# Patient Record
Sex: MALE | Race: WHITE | HISPANIC OR LATINO | Employment: FULL TIME | URBAN - METROPOLITAN AREA
[De-identification: names, ages, dates, MRNs, and addresses within clinical notes are randomized per-mention and may not be internally consistent; named-entity substitution may affect disease eponyms.]

---

## 2017-02-03 ENCOUNTER — OFFICE VISIT (OUTPATIENT)
Dept: URGENT CARE | Facility: CLINIC | Age: 32
End: 2017-02-03
Payer: OTHER GOVERNMENT

## 2017-02-03 VITALS
HEART RATE: 76 BPM | OXYGEN SATURATION: 95 % | TEMPERATURE: 97.4 F | RESPIRATION RATE: 16 BRPM | DIASTOLIC BLOOD PRESSURE: 80 MMHG | SYSTOLIC BLOOD PRESSURE: 124 MMHG

## 2017-02-03 DIAGNOSIS — R05.9 COUGH: ICD-10-CM

## 2017-02-03 DIAGNOSIS — R11.2 NON-INTRACTABLE VOMITING WITH NAUSEA, UNSPECIFIED VOMITING TYPE: ICD-10-CM

## 2017-02-03 DIAGNOSIS — H61.21 IMPACTED CERUMEN OF RIGHT EAR: ICD-10-CM

## 2017-02-03 DIAGNOSIS — J11.1 INFLUENZA-LIKE ILLNESS: Primary | ICD-10-CM

## 2017-02-03 LAB
FLUAV+FLUBV AG SPEC QL IA: NEGATIVE
INT CON NEG: NEGATIVE
INT CON POS: POSITIVE

## 2017-02-03 PROCEDURE — 99204 OFFICE O/P NEW MOD 45 MIN: CPT | Mod: 25 | Performed by: PHYSICIAN ASSISTANT

## 2017-02-03 PROCEDURE — 69210 REMOVE IMPACTED EAR WAX UNI: CPT | Performed by: PHYSICIAN ASSISTANT

## 2017-02-03 PROCEDURE — 87804 INFLUENZA ASSAY W/OPTIC: CPT | Performed by: PHYSICIAN ASSISTANT

## 2017-02-03 RX ORDER — ONDANSETRON 4 MG/1
4 TABLET, ORALLY DISINTEGRATING ORAL ONCE
Status: COMPLETED | OUTPATIENT
Start: 2017-02-03 | End: 2017-02-03

## 2017-02-03 RX ORDER — ONDANSETRON 4 MG/1
4 TABLET, ORALLY DISINTEGRATING ORAL EVERY 4 HOURS PRN
Qty: 20 TAB | Refills: 0 | Status: SHIPPED | OUTPATIENT
Start: 2017-02-03 | End: 2018-02-26

## 2017-02-03 RX ORDER — OSELTAMIVIR PHOSPHATE 75 MG/1
75 CAPSULE ORAL 2 TIMES DAILY
Qty: 10 CAP | Refills: 0 | Status: SHIPPED | OUTPATIENT
Start: 2017-02-03 | End: 2018-02-26

## 2017-02-03 RX ADMIN — ONDANSETRON 4 MG: 4 TABLET, ORALLY DISINTEGRATING ORAL at 10:16

## 2017-02-03 ASSESSMENT — ENCOUNTER SYMPTOMS
COUGH: 1
RHINORRHEA: 1
SORE THROAT: 0
SINUS PAIN: 1
NAUSEA: 1
HEADACHES: 1
MYALGIAS: 1
CHILLS: 1
VOMITING: 1

## 2017-02-03 NOTE — PATIENT INSTRUCTIONS
"Influenza, Adult  Influenza (\"the flu\") is a viral infection of the respiratory tract. It occurs more often in winter months because people spend more time in close contact with one another. Influenza can make you feel very sick. Influenza easily spreads from person to person (contagious).  CAUSES   Influenza is caused by a virus that infects the respiratory tract. You can catch the virus by breathing in droplets from an infected person's cough or sneeze. You can also catch the virus by touching something that was recently contaminated with the virus and then touching your mouth, nose, or eyes.  RISKS AND COMPLICATIONS  You may be at risk for a more severe case of influenza if you smoke cigarettes, have diabetes, have chronic heart disease (such as heart failure) or lung disease (such as asthma), or if you have a weakened immune system. Elderly people and pregnant women are also at risk for more serious infections. The most common problem of influenza is a lung infection (pneumonia). Sometimes, this problem can require emergency medical care and may be life threatening.  SIGNS AND SYMPTOMS   Symptoms typically last 4 to 10 days and may include:  · Fever.  · Chills.  · Headache, body aches, and muscle aches.  · Sore throat.  · Chest discomfort and cough.  · Poor appetite.  · Weakness or feeling tired.  · Dizziness.  · Nausea or vomiting.  DIAGNOSIS   Diagnosis of influenza is often made based on your history and a physical exam. A nose or throat swab test can be done to confirm the diagnosis.  TREATMENT   In mild cases, influenza goes away on its own. Treatment is directed at relieving symptoms. For more severe cases, your health care provider may prescribe antiviral medicines to shorten the sickness. Antibiotic medicines are not effective because the infection is caused by a virus, not by bacteria.  HOME CARE INSTRUCTIONS  · Take medicines only as directed by your health care provider.  · Use a cool mist humidifier " to make breathing easier.  · Get plenty of rest until your temperature returns to normal. This usually takes 3 to 4 days.  · Drink enough fluid to keep your urine clear or pale yellow.  · Cover your mouth and nose when coughing or sneezing, and wash your hands well to prevent the virus from spreading.  · Stay home from work or school until the fever is gone for at least 1 full day.  PREVENTION   An annual influenza vaccination (flu shot) is the best way to avoid getting influenza. An annual flu shot is now routinely recommended for all adults in the [place]  SEEK MEDICAL CARE IF:  · You experience chest pain, your cough worsens, or you produce more mucus.  · You have nausea, vomiting, or diarrhea.  · Your fever returns or gets worse.  SEEK IMMEDIATE MEDICAL CARE IF:  · You have trouble breathing, you become short of breath, or your skin or nails become bluish.  · You have severe pain or stiffness in the neck.  · You develop a sudden headache, or pain in the face or ear.  · You have nausea or vomiting that you cannot control.  MAKE SURE YOU:   · Understand these instructions.  · Will watch your condition.  · Will get help right away if you are not doing well or get worse.     This information is not intended to replace advice given to you by your health care provider. Make sure you discuss any questions you have with your health care provider.     Document Released: 12/15/2001 Document Revised: 01/08/2016 Document Reviewed: 03/18/2013  [employer] Interactive Patient Education ©2016 [employer]

## 2017-02-03 NOTE — MR AVS SNAPSHOT
Roshan Bailey   2/3/2017 9:15 AM   Office Visit   MRN: [id number]    [facility]  [facility]   Dept Phone:  [phone]    Description:  Male : 1985   Provider:  Rosemarie Howard PA-C           Reason for Visit     Emesis ear ringing, congestion, SOB, headache X yesterday       Allergies as of 2/3/2017     No Known Allergies      You were diagnosed with     Influenza-like illness   [937580]  -  Primary     Cough   [786.2.ICD-9-CM]       Impacted cerumen of right ear   [931791]       Non-intractable vomiting with nausea, unspecified vomiting type   [1604399]         Vital Signs     Blood Pressure Pulse Temperature Respirations Oxygen Saturation       124/80 mmHg 76 36.3 °C (97.4 °F) 16 95%       Basic Information     Date Of Birth Sex Race Ethnicity Preferred Language    1985 Male White Non- English      Health Maintenance     Patient has no pending health maintenance at this time      Results     POCT Influenza A/B      Component    Rapid Influenza A-B    Negative    Internal Control Positive    Positive    Internal Control Negative    Negative                        Current Immunizations     No immunizations on file.      Below and/or attached are the medications your provider expects you to take. Review all of your home medications and newly ordered medications with your provider and/or pharmacist. Follow medication instructions as directed by your provider and/or pharmacist. Please keep your medication list with you and share with your provider. Update the information when medications are discontinued, doses are changed, or new medications (including over-the-counter products) are added; and carry medication information at all times in the event of emergency situations     Allergies:  No Known Allergies          Medications  Valid as of: 2017 - 10:37 AM    Generic Name Brand Name Tablet Size Instructions for use    Ondansetron (TABLET DISPERSIBLE) ZOFRAN ODT 4  "MG Take 1 Tab by mouth every four hours as needed for Nausea/Vomiting.        Oseltamivir Phosphate (Cap) TAMIFLU 75 MG Take 1 Cap by mouth 2 times a day.        .                 Medicines prescribed today were sent to:     [facility] [place] - [address]    [address] [place] [address]    Phone: [phone] Fax: [phone]    Open 24 Hours?: No      Medication refill instructions:       If your prescription bottle indicates you have medication refills left, it is not necessary to call your provider’s office. Please contact your pharmacy and they will refill your medication.    If your prescription bottle indicates you do not have any refills left, you may request refills at any time through one of the following ways: The online [email or web address] system (except Urgent Care), by calling your provider’s office, or by asking your pharmacy to contact your provider’s office with a refill request. Medication refills are processed only during regular business hours and may not be available until the next business day. Your provider may request additional information or to have a follow-up visit with you prior to refilling your medication.   *Please Note: Medication refills are assigned a new Rx number when refilled electronically. Your pharmacy may indicate that no refills were authorized even though a new prescription for the same medication is available at the pharmacy. Please request the medicine by name with the pharmacy before contacting your provider for a refill.        Instructions    Influenza, Adult  Influenza (\"the flu\") is a viral infection of the respiratory tract. It occurs more often in winter months because people spend more time in close contact with one another. Influenza can make you feel very sick. Influenza easily spreads from person to person (contagious).  CAUSES   Influenza is caused by a virus that infects the respiratory tract. You can catch the virus by breathing in droplets from an infected " person's cough or sneeze. You can also catch the virus by touching something that was recently contaminated with the virus and then touching your mouth, nose, or eyes.  RISKS AND COMPLICATIONS  You may be at risk for a more severe case of influenza if you smoke cigarettes, have diabetes, have chronic heart disease (such as heart failure) or lung disease (such as asthma), or if you have a weakened immune system. Elderly people and pregnant women are also at risk for more serious infections. The most common problem of influenza is a lung infection (pneumonia). Sometimes, this problem can require emergency medical care and may be life threatening.  SIGNS AND SYMPTOMS   Symptoms typically last 4 to 10 days and may include:  · Fever.  · Chills.  · Headache, body aches, and muscle aches.  · Sore throat.  · Chest discomfort and cough.  · Poor appetite.  · Weakness or feeling tired.  · Dizziness.  · Nausea or vomiting.  DIAGNOSIS   Diagnosis of influenza is often made based on your history and a physical exam. A nose or throat swab test can be done to confirm the diagnosis.  TREATMENT   In mild cases, influenza goes away on its own. Treatment is directed at relieving symptoms. For more severe cases, your health care provider may prescribe antiviral medicines to shorten the sickness. Antibiotic medicines are not effective because the infection is caused by a virus, not by bacteria.  HOME CARE INSTRUCTIONS  · Take medicines only as directed by your health care provider.  · Use a cool mist humidifier to make breathing easier.  · Get plenty of rest until your temperature returns to normal. This usually takes 3 to 4 days.  · Drink enough fluid to keep your urine clear or pale yellow.  · Cover your mouth and nose when coughing or sneezing, and wash your hands well to prevent the virus from spreading.  · Stay home from work or school until the fever is gone for at least 1 full day.  PREVENTION   An annual influenza vaccination  (flu shot) is the best way to avoid getting influenza. An annual flu shot is now routinely recommended for all adults in the [place]  SEEK MEDICAL CARE IF:  · You experience chest pain, your cough worsens, or you produce more mucus.  · You have nausea, vomiting, or diarrhea.  · Your fever returns or gets worse.  SEEK IMMEDIATE MEDICAL CARE IF:  · You have trouble breathing, you become short of breath, or your skin or nails become bluish.  · You have severe pain or stiffness in the neck.  · You develop a sudden headache, or pain in the face or ear.  · You have nausea or vomiting that you cannot control.  MAKE SURE YOU:   · Understand these instructions.  · Will watch your condition.  · Will get help right away if you are not doing well or get worse.     This information is not intended to replace advice given to you by your health care provider. Make sure you discuss any questions you have with your health care provider.     Document Released: 12/15/2001 Document Revised: 01/08/2016 Document Reviewed: 03/18/2013  [employer] Interactive Patient Education ©2016 [employer].            Estrogen Gene Test Access Code: [email or web address]  Expires: 3/5/2017 10:37 AM    Your email address is not on file at [facility].  Email Addresses are required for you to sign up for Estrogen Gene Test, please contact [phone] to verify your personal information and to provide your email address prior to attempting to register for Estrogen Gene Test[place] [address]    Estrogen Gene Test  A secure, online tool to manage your health information     [facility] Estrogen Gene Test® is a secure, online tool that connects you to your personalized health information from the privacy of your home -- day or night - making it very easy for you to manage your healthcare. Once the activation process is completed, you can even access your medical information using the Estrogen Gene Test gideon, which is available for free in the Apple Gideon store or Google Play store.      To learn more about [employer], visit [email or web address]    There are two levels of access available (as shown below):   My Chart Features  [facility]  Specialists [facility]  Urgent  Care Non-[facility] Doctor   Email your healthcare team securely and privately 24/7 X X X    Manage appointments: schedule your next appointment; view details of past/upcoming appointments X      Request prescription refills. X      View recent personal medical records, including lab and immunizations X X X X   View health record, including health history, allergies, medications X X X X   Read reports about your outpatient visits, procedures, consult and ER notes X X X X   See your discharge summary, which is a recap of your hospital and/or ER visit that includes your diagnosis, lab results, and care plan X X  X     How to register for [employer]:  Once your e-mail address has been verified, follow the following steps to sign up for [employer].     1. Go to  [email or web address]  2. Click on the Sign Up Now box, which takes you to the New Member Sign Up page. You will need to provide the following information:  a. Enter your [employer] Access Code exactly as it appears at the top of this page. (You will not need to use this code after you’ve completed the sign-up process. If you do not sign up before the expiration date, you must request a new code.)   b. Enter your date of birth.   c. Enter your home email address.   d. Click Submit, and follow the next screen’s instructions.  3. Create a Inkshares ID. This will be your [facility] login ID and cannot be changed, so think of one that is secure and easy to remember.  4. Create a [employer] password. You can change your password at any time.  5. Enter your Password Reset Question and Answer. This can be used at a later time if you forget your password.   6. Enter your e-mail address. This allows you to receive e-mail notifications when new information is available  in [facility].  7. Click Sign Up. You can now view your health information.    For assistance activating your [employer] account, call [phone]

## 2017-02-03 NOTE — PROGRESS NOTES
Subjective:      Roshan Bailey is a 31 y.o. male who presents with Emesis    Pt PMH, SocHx, SurgHx, FamHx, Drug allergies and medications reviewed with pt/EPIC.      Family history reviewed, it is not pertinent to this complaint.         HPI Comments: Patient presents with:  Emesis: ear ringing, congestion, SOB, headache X yesterday         URI   The current episode started yesterday. The problem has been gradually worsening. The maximum temperature recorded prior to his arrival was 100.4 - 100.9 F. The fever has been present for less than 1 day. Associated symptoms include coughing, headaches, nausea, a plugged ear sensation (right), rhinorrhea, sinus pain, sneezing and vomiting. Pertinent negatives include no ear pain or sore throat. He has tried nothing for the symptoms. The treatment provided no relief.     Review of Systems   Constitutional: Positive for chills.   HENT: Positive for rhinorrhea and sneezing. Negative for ear pain and sore throat.    Respiratory: Positive for cough.    Gastrointestinal: Positive for nausea and vomiting.   Musculoskeletal: Positive for myalgias.   Neurological: Positive for headaches.   All other systems reviewed and are negative.       Objective:     /80 mmHg  Pulse 76  Temp(Src) 36.3 °C (97.4 °F)  Resp 16  SpO2 95%     Physical Exam   Constitutional: He is oriented to person, place, and time. He appears well-developed and well-nourished. He appears ill. No distress.   HENT:   Head: Normocephalic.   Left Ear: Tympanic membrane normal.   Nose: Mucosal edema and rhinorrhea present.   Mouth/Throat: Uvula is midline and mucous membranes are normal. Posterior oropharyngeal erythema present.   Right canal occluded by cerumen, TM not visible will re-evaluate after ear lavage.    Eyes: Conjunctivae, EOM and lids are normal. Pupils are equal, round, and reactive to light.   Neck: Trachea normal and normal range of motion. Neck supple. No JVD present.   Cardiovascular:  Regular rhythm and normal heart sounds.  Tachycardia present.    Pulmonary/Chest: Effort normal. He has rhonchi. He has no rales.   Abdominal: Soft.   Musculoskeletal: Normal range of motion.   Lymphadenopathy:     He has no cervical adenopathy.   Neurological: He is alert and oriented to person, place, and time.   Skin: Skin is warm and dry.   Face is flushed     Psychiatric: He has a normal mood and affect.   Vitals reviewed.       Procedure: Cerumen Removal  Risks and benefits of procedure discussed  Cerumen removed with curette and lavage after softening agent instilled  Patient tolerated well  Post procedure exam with clear canal and normal TM       Assessment/Plan:     1. Influenza-like illness  POCT Influenza A/B    oseltamivir (TAMIFLU) 75 MG Cap    ondansetron (ZOFRAN ODT) 4 MG TABLET DISPERSIBLE    ondansetron (ZOFRAN ODT) dispertab 4 mg   2. Cough  POCT Influenza A/B    oseltamivir (TAMIFLU) 75 MG Cap    ondansetron (ZOFRAN ODT) 4 MG TABLET DISPERSIBLE    ondansetron (ZOFRAN ODT) dispertab 4 mg   3. Impacted cerumen of right ear  POCT Influenza A/B    oseltamivir (TAMIFLU) 75 MG Cap    ondansetron (ZOFRAN ODT) 4 MG TABLET DISPERSIBLE    ondansetron (ZOFRAN ODT) dispertab 4 mg   4. Non-intractable vomiting with nausea, unspecified vomiting type       Discussed that I felt this was viral in nature. Did not see any evidence of a bacterial process. Discussed natural progression and sx care.    PT should follow up with PCP in 1-2 days for re-evaluation if symptoms have not improved.  Discussed red flags and reasons to return to UC or ED.  Pt and/or family verbalized understanding of diagnosis and follow up instructions and was given informational handout on diagnosis.  PT discharged.

## 2017-02-03 NOTE — Clinical Note
February 3, 2017         Patient: Roshan Bailey   YOB: 1985   Date of Visit: 2/3/2017           To Whom it May Concern:    Roshan Bailey was seen in my clinic on 2/3/2017. He may return to work on 02/07/2017..    If you have any questions or concerns, please don't hesitate to call.        Sincerely,           Rosemaire Howard PA-C  Electronically Signed

## 2017-06-23 ENCOUNTER — HOSPITAL ENCOUNTER (OUTPATIENT)
Dept: RADIOLOGY | Facility: MEDICAL CENTER | Age: 32
End: 2017-06-23
Attending: PHYSICIAN ASSISTANT
Payer: OTHER GOVERNMENT

## 2017-06-23 ENCOUNTER — OFFICE VISIT (OUTPATIENT)
Dept: URGENT CARE | Facility: PHYSICIAN GROUP | Age: 32
End: 2017-06-23
Payer: OTHER GOVERNMENT

## 2017-06-23 VITALS
HEART RATE: 64 BPM | TEMPERATURE: 98.1 F | HEIGHT: 71 IN | OXYGEN SATURATION: 97 % | BODY MASS INDEX: 28 KG/M2 | WEIGHT: 200 LBS | SYSTOLIC BLOOD PRESSURE: 128 MMHG | DIASTOLIC BLOOD PRESSURE: 92 MMHG

## 2017-06-23 DIAGNOSIS — G89.29 CHRONIC PAIN OF LEFT KNEE: ICD-10-CM

## 2017-06-23 DIAGNOSIS — M25.562 CHRONIC PAIN OF LEFT KNEE: ICD-10-CM

## 2017-06-23 PROCEDURE — 99214 OFFICE O/P EST MOD 30 MIN: CPT | Performed by: PHYSICIAN ASSISTANT

## 2017-06-23 PROCEDURE — 73564 X-RAY EXAM KNEE 4 OR MORE: CPT | Mod: LT

## 2017-06-23 ASSESSMENT — ENCOUNTER SYMPTOMS
COUGH: 0
PALPITATIONS: 0
FOCAL WEAKNESS: 0
TINGLING: 0
FEVER: 0
SHORTNESS OF BREATH: 0
CHILLS: 0
LOSS OF CONSCIOUSNESS: 0

## 2017-06-23 NOTE — Clinical Note
June 23, 2017         Patient: Roshan Bailey   YOB: 1985   Date of Visit: 6/23/2017           To Whom it May Concern:    Roshan Bailey was seen in my clinic on 6/23/2017. 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    If you have any questions or concerns, please don't hesitate to call.        Sincerely,           Ra eLnnon PA-C  Electronically Signed

## 2017-06-23 NOTE — PATIENT INSTRUCTIONS
RICE for Routine Care of Injuries  The routine care of many injuries includes rest, ice, compression, and elevation (RICE). The RICE strategy is often recommended for injuries to soft tissues, such as a muscle strain, ligament injuries, bruises, and overuse injuries. It can also be used for some bony injuries. Using the RICE strategy can help to relieve pain, lessen swelling, and enable your body to heal.  Rest  Rest is required to allow your body to heal. This usually involves reducing your normal activities and avoiding use of the injured part of your body. Generally, you can return to your normal activities when you are comfortable and have been given permission by your health care provider.  Ice  Icing your injury helps to keep the swelling down, and it lessens pain. Do not apply ice directly to your skin.  · Put ice in a plastic bag.  · Place a towel between your skin and the bag.  · Leave the ice on for 20 minutes, 2-3 times a day.  Do this for as long as you are directed by your health care provider.  Compression  Compression means putting pressure on the injured area. Compression helps to keep swelling down, gives support, and helps with discomfort. Compression may be done with an elastic bandage. If an elastic bandage has been applied, follow these general tips:  · Remove and reapply the bandage every 3-4 hours or as directed by your health care provider.  · Make sure the bandage is not wrapped too tightly, because this can cut off circulation. If part of your body beyond the bandage becomes blue, numb, cold, swollen, or more painful, your bandage is most likely too tight. If this occurs, remove your bandage and reapply it more loosely.  · See your health care provider if the bandage seems to be making your problems worse rather than better.  Elevation  Elevation means keeping the injured area raised. This helps to lessen swelling and decrease pain. If possible, your injured area should be elevated at or  above the level of your heart or the center of your chest.  WHEN SHOULD I SEEK MEDICAL CARE?  You should seek medical care if:  · Your pain and swelling continue.  · Your symptoms are getting worse rather than improving.  These symptoms may indicate that further evaluation or further X-rays are needed. Sometimes, X-rays may not show a small broken bone (fracture) until a number of days later. Make a follow-up appointment with your health care provider.  WHEN SHOULD I SEEK IMMEDIATE MEDICAL CARE?  You should seek immediate medical care if:  · You have sudden severe pain at or below the area of your injury.  · You have redness or increased swelling around your injury.  · You have tingling or numbness at or below the area of your injury that does not improve after you remove the elastic bandage.     This information is not intended to replace advice given to you by your health care provider. Make sure you discuss any questions you have with your health care provider.     Document Released: 04/01/2002 Document Revised: 12/23/2014 Document Reviewed: 11/25/2015  [employer] Interactive Patient Education ©2016 [employer].

## 2017-06-23 NOTE — PROGRESS NOTES
"Subjective:      Roshan Bailey is a 31 y.o. male who presents with Knee Pain            Knee Pain  This is a new problem. The current episode started more than 1 month ago. The problem occurs constantly. The problem has been unchanged. Pertinent negatives include no chest pain, chills, coughing, fever or rash. Associated symptoms comments: Stiffness and pain.. The symptoms are aggravated by walking (sitting). He has tried NSAIDs and ice for the symptoms. The treatment provided no relief.       Review of Systems   Constitutional: Negative for fever and chills.   Respiratory: Negative for cough and shortness of breath.    Cardiovascular: Negative for chest pain and palpitations.   Musculoskeletal: Positive for joint pain.        Left knee pain   Skin: Negative for rash.   Neurological: Negative for tingling, focal weakness and loss of consciousness.     All other systems reviewed and are negative.  PMH:  has no past medical history on file.  MEDS:   Current outpatient prescriptions:   •  oseltamivir (TAMIFLU) 75 MG Cap, Take 1 Cap by mouth 2 times a day., Disp: 10 Cap, Rfl: 0  •  ondansetron (ZOFRAN ODT) 4 MG TABLET DISPERSIBLE, Take 1 Tab by mouth every four hours as needed for Nausea/Vomiting., Disp: 20 Tab, Rfl: 0  ALLERGIES: No Known Allergies  SURGHX: History reviewed. No pertinent past surgical history.  SOCHX:    FH: Family history was reviewed, no pertinent findings to report  Medications, Allergies, and current problem list reviewed today in Epic       Objective:     /92 mmHg  Pulse 64  Temp(Src) 36.7 °C (98.1 °F)  Ht 1.803 m (5' 10.98\")  Wt 90.719 kg (200 lb)  BMI 27.91 kg/m2  SpO2 97%     Physical Exam   Constitutional: He is oriented to person, place, and time. He appears well-developed and well-nourished.   Cardiovascular: Normal rate, regular rhythm, normal heart sounds, intact distal pulses and normal pulses.    Pulmonary/Chest: Effort normal and breath sounds normal. "   Musculoskeletal: Normal range of motion. He exhibits tenderness. He exhibits no edema or deformity.   Pain to palpation of left knee.  Pain with squatting.  NEG anterior/post drawer.  NEG varus/valgus.   Neurological: He is alert and oriented to person, place, and time. He has normal reflexes. He displays normal reflexes. He exhibits normal muscle tone. Coordination normal.   Skin: Skin is warm and dry.   Psychiatric: He has a normal mood and affect. His behavior is normal. Judgment and thought content normal.   Vitals reviewed.         6/23/2017 4:07 PM    HISTORY/REASON FOR EXAM:  Chronic left knee pain with no history of injury      TECHNIQUE/EXAM DESCRIPTION AND NUMBER OF VIEWS:  4 views of the LEFT knee.    COMPARISON: None    FINDINGS:  Bone density is normal.  There is no evidence of fracture or dislocation.  There is no evidence of arthropathy.  There is no joint effusion.         Impression        No evidence of fracture or dislocation.          Assessment/Plan:   Patient is a 31-year-old male who presents with pain of the left knee for over a month. No known injury has occurred. Patient works in the [employer] and is active daily running. He reports pain at rest as well. He denies numbness or tingling of the limb. Vital signs normal. Patient has good range of motion of the knee. Anterior/posterior drawer negative. There is valgus are negative. There is pain when he squats. X-rays are negative. This most likely is due to an overuse injury. We will trial pain management of Voltaren gel and referred to sports medicine.    1. Chronic pain of left knee  DX-KNEE COMPLETE 4+ LEFT    REFERRAL TO SPORTS MEDICINE    Diclofenac Sodium 1 % Gel     Differential diagnosis, natural history, supportive care, and indications for immediate follow-up discussed at length.   Follow-up with primary care provider within 4-5 days, emergency room precautions discussed.  Patient and/or family appears understanding of  information.

## 2017-06-23 NOTE — MR AVS SNAPSHOT
"        Roshan Bailey   2017 3:30 PM   Office Visit   MRN: [id number]    Department:  [facility]   Dept Phone:  [phone]    Description:  Male : 1985   Provider:  Ra Lennon PA-C           Reason for Visit     Knee Pain left knee pain x1 month, no known injury      Allergies as of 2017     No Known Allergies      You were diagnosed with     Chronic pain of left knee   [062122]         Vital Signs     Blood Pressure Pulse Temperature Height Weight Body Mass Index    128/92 mmHg 64 36.7 °C (98.1 °F) 1.803 m (5' 10.98\") 90.719 kg (200 lb) 27.91 kg/m2    Oxygen Saturation                   97%           Basic Information     Date Of Birth Sex Race Ethnicity Preferred Language    1985 Male White Non- English      Health Maintenance        Date Due Completion Dates    IMM DTaP/Tdap/Td Vaccine (1 - Tdap) 2006 ---            Current Immunizations     No immunizations on file.      Below and/or attached are the medications your provider expects you to take. Review all of your home medications and newly ordered medications with your provider and/or pharmacist. Follow medication instructions as directed by your provider and/or pharmacist. Please keep your medication list with you and share with your provider. Update the information when medications are discontinued, doses are changed, or new medications (including over-the-counter products) are added; and carry medication information at all times in the event of emergency situations     Allergies:  No Known Allergies          Medications  Valid as of: 2017 -  5:18 PM    Generic Name Brand Name Tablet Size Instructions for use    Diclofenac Sodium (Gel) Diclofenac Sodium 1 % Apply 2-4 grams to affected area 4 times daily as needed for pain.        Ondansetron (TABLET DISPERSIBLE) ZOFRAN ODT 4 MG Take 1 Tab by mouth every four hours as needed for Nausea/Vomiting.        Oseltamivir Phosphate (Cap) TAMIFLU 75 " MG Take 1 Cap by mouth 2 times a day.        .                 Medicines prescribed today were sent to:     [id number] - [place], [place] - [address]    [address] [place] [address]    Phone: [phone] Fax: [phone]    Open 24 Hours?: No      Medication refill instructions:       If your prescription bottle indicates you have medication refills left, it is not necessary to call your provider’s office. Please contact your pharmacy and they will refill your medication.    If your prescription bottle indicates you do not have any refills left, you may request refills at any time through one of the following ways: The online [facility] system (except Urgent Care), by calling your provider’s office, or by asking your pharmacy to contact your provider’s office with a refill request. Medication refills are processed only during regular business hours and may not be available until the next business day. Your provider may request additional information or to have a follow-up visit with you prior to refilling your medication.   *Please Note: Medication refills are assigned a new Rx number when refilled electronically. Your pharmacy may indicate that no refills were authorized even though a new prescription for the same medication is available at the pharmacy. Please request the medicine by name with the pharmacy before contacting your provider for a refill.        Your To Do List     Future Labs/Procedures Complete By Expires    DX-KNEE COMPLETE 4+ LEFT  As directed 6/23/2018      Referral     A referral request has been sent to our patient care coordination department. Please allow 3-5 business days for us to process this request and contact you either by phone or mail. If you do not hear from us by the 5th business day, please call us at [phone].        Instructions    RICE for Routine Care of Injuries  The routine care of many injuries includes rest, ice, compression, and elevation  (RICE). The RICE strategy is often recommended for injuries to soft tissues, such as a muscle strain, ligament injuries, bruises, and overuse injuries. It can also be used for some bony injuries. Using the RICE strategy can help to relieve pain, lessen swelling, and enable your body to heal.  Rest  Rest is required to allow your body to heal. This usually involves reducing your normal activities and avoiding use of the injured part of your body. Generally, you can return to your normal activities when you are comfortable and have been given permission by your health care provider.  Ice  Icing your injury helps to keep the swelling down, and it lessens pain. Do not apply ice directly to your skin.  · Put ice in a plastic bag.  · Place a towel between your skin and the bag.  · Leave the ice on for 20 minutes, 2-3 times a day.  Do this for as long as you are directed by your health care provider.  Compression  Compression means putting pressure on the injured area. Compression helps to keep swelling down, gives support, and helps with discomfort. Compression may be done with an elastic bandage. If an elastic bandage has been applied, follow these general tips:  · Remove and reapply the bandage every 3-4 hours or as directed by your health care provider.  · Make sure the bandage is not wrapped too tightly, because this can cut off circulation. If part of your body beyond the bandage becomes blue, numb, cold, swollen, or more painful, your bandage is most likely too tight. If this occurs, remove your bandage and reapply it more loosely.  · See your health care provider if the bandage seems to be making your problems worse rather than better.  Elevation  Elevation means keeping the injured area raised. This helps to lessen swelling and decrease pain. If possible, your injured area should be elevated at or above the level of your heart or the center of your chest.  WHEN SHOULD I SEEK MEDICAL CARE?  You should seek medical  care if:  · Your pain and swelling continue.  · Your symptoms are getting worse rather than improving.  These symptoms may indicate that further evaluation or further X-rays are needed. Sometimes, X-rays may not show a small broken bone (fracture) until a number of days later. Make a follow-up appointment with your health care provider.  WHEN SHOULD I SEEK IMMEDIATE MEDICAL CARE?  You should seek immediate medical care if:  · You have sudden severe pain at or below the area of your injury.  · You have redness or increased swelling around your injury.  · You have tingling or numbness at or below the area of your injury that does not improve after you remove the elastic bandage.     This information is not intended to replace advice given to you by your health care provider. Make sure you discuss any questions you have with your health care provider.     Document Released: 04/01/2002 Document Revised: 12/23/2014 Document Reviewed: 11/25/2015  Fifty100 Interactive Patient Education ©2016 Elsevier Inc.            My Dog Bowl Access Code: [email or web address]  Expires: 7/23/2017  5:18 PM    My Dog Bowl  A secure, online tool to manage your health information     [employer] My Dog Bowl® is a secure, online tool that connects you to your personalized health information from the privacy of your home -- day or night - making it very easy for you to manage your healthcare. Once the activation process is completed, you can even access your medical information using the My Dog Bowl gideon, which is available for free in the Apple Gideon store or Google Play store.     My Dog Bowl provides the following levels of access (as shown below):   My Chart Features   [facility] Primary [facility] Doctor [facility]  Specialists [facility]  Urgent  Care Non-[facility]  Primary Care  Doctor   Email your healthcare team securely and privately 24/7 X X X    Manage appointments: schedule your next appointment; view details of past/upcoming appointments X      Request prescription  refills. X      View recent personal medical records, including lab and immunizations X X X X   View health record, including health history, allergies, medications X X X X   Read reports about your outpatient visits, procedures, consult and ER notes X X X X   See your discharge summary, which is a recap of your hospital and/or ER visit that includes your diagnosis, lab results, and care plan. X X       How to register for Melinta:  1. Go to  [email or web address].  2. Click on the Sign Up Now box, which takes you to the New Member Sign Up page. You will need to provide the following information:  a. Enter your Melinta Access Code exactly as it appears at the top of this page. (You will not need to use this code after you’ve completed the sign-up process. If you do not sign up before the expiration date, you must request a new code.)   b. Enter your date of birth.   c. Enter your home email address.   d. Click Submit, and follow the next screen’s instructions.  3. Create a Melinta ID. This will be your Melinta login ID and cannot be changed, so think of one that is secure and easy to remember.  4. Create a Melinta password. You can change your password at any time.  5. Enter your Password Reset Question and Answer. This can be used at a later time if you forget your password.   6. Enter your e-mail address. This allows you to receive e-mail notifications when new information is available in Melinta.  7. Click Sign Up. You can now view your health information.    For assistance activating your Melinta account, call [phone]

## 2017-10-03 ENCOUNTER — OFFICE VISIT (OUTPATIENT)
Dept: URGENT CARE | Facility: CLINIC | Age: 32
End: 2017-10-03
Payer: OTHER GOVERNMENT

## 2017-10-03 VITALS
HEART RATE: 87 BPM | BODY MASS INDEX: 28.42 KG/M2 | OXYGEN SATURATION: 97 % | TEMPERATURE: 97.2 F | WEIGHT: 203 LBS | HEIGHT: 71 IN | DIASTOLIC BLOOD PRESSURE: 80 MMHG | SYSTOLIC BLOOD PRESSURE: 110 MMHG

## 2017-10-03 DIAGNOSIS — M25.562 ACUTE PAIN OF LEFT KNEE: ICD-10-CM

## 2017-10-03 DIAGNOSIS — S39.012A STRAIN OF LUMBAR REGION, INITIAL ENCOUNTER: ICD-10-CM

## 2017-10-03 PROCEDURE — 99214 OFFICE O/P EST MOD 30 MIN: CPT | Performed by: PHYSICIAN ASSISTANT

## 2017-10-03 RX ORDER — NAPROXEN 500 MG/1
500 TABLET ORAL 2 TIMES DAILY WITH MEALS
Qty: 60 TAB | Refills: 1 | Status: SHIPPED | OUTPATIENT
Start: 2017-10-03 | End: 2018-05-22

## 2017-10-03 RX ORDER — KETOROLAC TROMETHAMINE 30 MG/ML
60 INJECTION, SOLUTION INTRAMUSCULAR; INTRAVENOUS ONCE
Status: COMPLETED | OUTPATIENT
Start: 2017-10-03 | End: 2017-10-03

## 2017-10-03 RX ORDER — CYCLOBENZAPRINE HCL 10 MG
10 TABLET ORAL 3 TIMES DAILY PRN
Qty: 30 TAB | Refills: 0 | Status: SHIPPED | OUTPATIENT
Start: 2017-10-03 | End: 2018-05-22

## 2017-10-03 RX ADMIN — KETOROLAC TROMETHAMINE 60 MG: 30 INJECTION, SOLUTION INTRAMUSCULAR; INTRAVENOUS at 11:09

## 2017-10-03 ASSESSMENT — ENCOUNTER SYMPTOMS
NECK PAIN: 0
LEG PAIN: 0
BRUISES/BLEEDS EASILY: 0
SHORTNESS OF BREATH: 0
SENSORY CHANGE: 0
DIARRHEA: 0
WEAKNESS: 0
FOCAL WEAKNESS: 0
BACK PAIN: 1
CHILLS: 0
FALLS: 0
NAUSEA: 0
BOWEL INCONTINENCE: 0
VOMITING: 0
WHEEZING: 0
ABDOMINAL PAIN: 0
TINGLING: 0
NUMBNESS: 0
FEVER: 0
PERIANAL NUMBNESS: 0

## 2017-10-03 NOTE — LETTER
October 3, 2017       Patient: Roshan Bailey   YOB: 1985   Date of Visit: 10/3/2017         To Whom It May Concern:    It is my medical opinion that Roshan Bailey should be excused from running while at work until he is evaluated by sports medicine physician.      If you have any questions or concerns, please don't hesitate to call [phone]          Sincerely,          Damian Cooper P.A.-C.  Electronically Signed

## 2017-10-03 NOTE — PROGRESS NOTES
"Subjective:      Roshan Bailey is a 32 y.o. male who presents with Back Pain (\"lower back pain,went running this morning and got worse,L knee pain )      Back Pain   This is a recurrent problem. The current episode started today. Pertinent negatives include no abdominal pain, bladder incontinence, bowel incontinence, fever, leg pain, numbness, perianal numbness, tingling or weakness. He has tried NSAIDs for the symptoms. Improvement on treatment: intermittent relief.   waxing waning low back tightness and pain x last few months to years, notes went for run this am which caused spasm and worsening, notes bilat lower back, denies radiation of pain. Denies saddle anesthesia, incontinence of urine or bowel, retention of urine or bowel, also denies numbness/tingling or weakness to UE/LE. Notes freq diarrhea, takes tylenol/nsaids, deneis trying ice or heat. Today pain is 8/10. Ran 1.5mils this am, notes pain is sharp.     C/o some constant pain to anterior knee over patella, was referred to PT thru UC, no call back, xray was neg, notes pain to left knee has been present for years. Had been getting swelling but that improved, notes back of knee w/ achy pain is fairly constant, unsure if swelling now, c/o catching or locking -not to painful, relief w/ popping knee cap, denies instability, denies specific injuries, c/o pain when driving or sitting too long. Typically patellar sharp pains, c/o some patellar catching w/ this.     Review of Systems   Constitutional: Negative for chills and fever.   Respiratory: Negative for shortness of breath and wheezing.    Cardiovascular: Negative for leg swelling.   Gastrointestinal: Negative for abdominal pain, bowel incontinence, diarrhea, nausea and vomiting.   Genitourinary: Negative for bladder incontinence.   Musculoskeletal: Positive for back pain and joint pain (pos for left knee pain). Negative for falls and neck pain.   Skin: Negative for rash.   Neurological: Negative " "for tingling, sensory change, focal weakness, weakness and numbness.   Endo/Heme/Allergies: Does not bruise/bleed easily.     PMH:  has no past medical history on file.  MEDS:   Current Outpatient Prescriptions:   •  Diclofenac Sodium 1 % Gel, Apply 2-4 grams to affected area 4 times daily as needed for pain., Disp: 1 Tube, Rfl: 0  •  oseltamivir (TAMIFLU) 75 MG Cap, Take 1 Cap by mouth 2 times a day., Disp: 10 Cap, Rfl: 0  •  ondansetron (ZOFRAN ODT) 4 MG TABLET DISPERSIBLE, Take 1 Tab by mouth every four hours as needed for Nausea/Vomiting., Disp: 20 Tab, Rfl: 0  ALLERGIES: No Known Allergies  SURGHX: No past surgical history on file.  SOCHX:  reports that he has never smoked. He has never used smokeless tobacco.  FH: Family history was reviewed, no pertinent findings to report       Objective:     /80   Pulse 87   Temp 36.2 °C (97.2 °F)   Ht 1.803 m (5' 11\")   Wt 92.1 kg (203 lb)   SpO2 97%   BMI 28.31 kg/m²      Physical Exam   Constitutional: He is oriented to person, place, and time. He appears well-developed and well-nourished. No distress.   HENT:   Head: Normocephalic and atraumatic.   Right Ear: External ear normal.   Left Ear: External ear normal.   Nose: Nose normal.   Eyes: Conjunctivae are normal. Right eye exhibits no discharge. Left eye exhibits no discharge. No scleral icterus.   Neck: Neck supple.   Pulmonary/Chest: Effort normal. No respiratory distress.   Musculoskeletal:        Left knee: He exhibits normal range of motion, no swelling and no effusion. Tenderness found.        Lumbar back: He exhibits decreased range of motion ( 2/2 pain), tenderness ( primary paraspinous), pain and spasm. He exhibits no bony tenderness, no swelling, no edema, no deformity, no laceration and normal pulse.   Neurological: He is alert and oriented to person, place, and time. He has normal strength and normal reflexes. He is not disoriented. No sensory deficit. He displays a negative Romberg sign. " Coordination normal.   SLR normal bilat   Skin: Skin is warm and dry. He is not diaphoretic. No pallor.   Psychiatric: He has a normal mood and affect.   Nursing note and vitals reviewed.      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 Strain of lumbar region, initial encounter  Recommend conservative care, rest, ice, elevation, work on gentle ROM exercises, quad strengthening, closed chain, Rx nsaids - avoid additional OTC nsaids, regular ice/heat to back, work on stretching, sent w/ handouts   F/u w/ sports med to determine if benefit from advanced imaging or PT  Return to clinic with lack of resolution or progression of symptoms.  Cautioned regarding potential for sedation with medication.  Sent w/ work note to minimize running    - REFERRAL TO FAMILY PRACTICE  - REFERRAL TO SPORTS MEDICINE  - ketorolac (TORADOL) injection 60 mg; 2 mL by Intramuscular route Once.  - cyclobenzaprine (FLEXERIL) 10 MG Tab; Take 1 Tab by mouth 3 times a day as needed.  Dispense: 30 Tab; Refill: 0  - naproxen (NAPROSYN) 500 MG Tab; Take 1 Tab by mouth 2 times a day, with meals.  Dispense: 60 Tab; Refill: 1    2. Acute pain of left knee    - REFERRAL TO SPORTS MEDICINE  - naproxen (NAPROSYN) 500 MG Tab; Take 1 Tab by mouth 2 times a day, with meals.  Dispense: 60 Tab; Refill: 1

## 2017-10-03 NOTE — LETTER
October 3, 2017       Patient: Roshan Bailey   YOB: 1985   Date of Visit: 10/3/2017         To Whom It May Concern:    It is my medical opinion that Roshan Bailey should be excused from work for today and tomorrow due to illness/injury.      If you have any questions or concerns, please don't hesitate to call [phone]          Sincerely,          Damian Cooper P.A.-C.  Electronically Signed

## 2017-10-12 ENCOUNTER — APPOINTMENT (OUTPATIENT)
Dept: RADIOLOGY | Facility: IMAGING CENTER | Age: 32
End: 2017-10-12
Attending: FAMILY MEDICINE
Payer: OTHER GOVERNMENT

## 2017-10-12 ENCOUNTER — OFFICE VISIT (OUTPATIENT)
Dept: MEDICAL GROUP | Facility: CLINIC | Age: 32
End: 2017-10-12
Payer: OTHER GOVERNMENT

## 2017-10-12 VITALS
DIASTOLIC BLOOD PRESSURE: 76 MMHG | SYSTOLIC BLOOD PRESSURE: 116 MMHG | WEIGHT: 203 LBS | TEMPERATURE: 97.8 F | BODY MASS INDEX: 28.42 KG/M2 | RESPIRATION RATE: 16 BRPM | OXYGEN SATURATION: 96 % | HEART RATE: 72 BPM | HEIGHT: 71 IN

## 2017-10-12 DIAGNOSIS — M54.42 ACUTE MIDLINE LOW BACK PAIN WITH BILATERAL SCIATICA: ICD-10-CM

## 2017-10-12 DIAGNOSIS — M25.862 PATELLOFEMORAL DYSFUNCTION, LEFT: ICD-10-CM

## 2017-10-12 DIAGNOSIS — M54.41 ACUTE MIDLINE LOW BACK PAIN WITH BILATERAL SCIATICA: ICD-10-CM

## 2017-10-12 PROCEDURE — 99203 OFFICE O/P NEW LOW 30 MIN: CPT | Performed by: FAMILY MEDICINE

## 2017-10-12 PROCEDURE — 72100 X-RAY EXAM L-S SPINE 2/3 VWS: CPT | Mod: TC | Performed by: FAMILY MEDICINE

## 2017-10-12 RX ORDER — KETOROLAC TROMETHAMINE 30 MG/ML
60 INJECTION, SOLUTION INTRAMUSCULAR; INTRAVENOUS ONCE
Status: COMPLETED | OUTPATIENT
Start: 2017-10-12 | End: 2017-10-12

## 2017-10-12 RX ADMIN — KETOROLAC TROMETHAMINE 60 MG: 30 INJECTION, SOLUTION INTRAMUSCULAR; INTRAVENOUS at 10:17

## 2017-10-12 NOTE — LETTER
October 12, 2017         Patient: Roshan Bailey   YOB: 1985   Date of Visit: 10/12/2017           To Whom it May Concern:    Roshan Bailey was seen in my clinic on 10/12/2017. He may return to work with restriction of avoiding running until re-evaluation in 4 weeks.    If you have any questions or concerns, please don't hesitate to call.        Sincerely,           Dylan Gunderson M.D.  Electronically Signed
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
## 2017-10-25 ENCOUNTER — PHYSICAL THERAPY (OUTPATIENT)
Dept: PHYSICAL THERAPY | Facility: REHABILITATION | Age: 32
End: 2017-10-25
Attending: FAMILY MEDICINE
Payer: OTHER GOVERNMENT

## 2017-10-25 DIAGNOSIS — M25.562 LEFT KNEE PAIN, UNSPECIFIED CHRONICITY: ICD-10-CM

## 2017-10-25 DIAGNOSIS — M25.862 PATELLOFEMORAL DYSFUNCTION, LEFT: ICD-10-CM

## 2017-10-25 DIAGNOSIS — R26.89 DECREASED MOBILITY: ICD-10-CM

## 2017-10-25 PROCEDURE — 97110 THERAPEUTIC EXERCISES: CPT

## 2017-10-25 PROCEDURE — G8979 MOBILITY GOAL STATUS: HCPCS | Mod: CI

## 2017-10-25 PROCEDURE — 97161 PT EVAL LOW COMPLEX 20 MIN: CPT

## 2017-10-25 PROCEDURE — G8978 MOBILITY CURRENT STATUS: HCPCS | Mod: CJ

## 2017-10-25 SDOH — ECONOMIC STABILITY: GENERAL: QUALITY OF LIFE: GOOD

## 2017-10-25 ASSESSMENT — ENCOUNTER SYMPTOMS
PAIN SCALE: 2
PAIN SCALE AT LOWEST: 1
PAIN SCALE AT HIGHEST: 2

## 2017-10-25 NOTE — OP THERAPY EVALUATION
Outpatient Physical Therapy  INITIAL EVALUATION    [facility] [place]  [address], [address]  [place] [address]  Phone:  [phone]  Fax:  [phone]    Date of Evaluation: 10/25/2017    Patient: Roshan Bailey  YOB: 1985  MRN: [id number]     Referring Provider: Dylan Gunderson M.D.  [address] [place] [address]   Referring Diagnosis Other specified joint disorders, left knee [M25.862]     Time Calculation  Start time: 0900  Stop time: 1000 Time Calculation (min): 60 minutes     Physical Therapy Occurrence Codes    Date physical therapy care plan established or reviewed:  10/25/17   Date physical therapy treatment started:  10/25/17             Chief Complaint: Knee Problem    Visit Diagnoses     ICD-10-CM   1. Patellofemoral dysfunction, left M25.862   2. Left knee pain, unspecified chronicity M25.562   3. Decreased mobility R26.89         Subjective:   History of Present Illness:     Date of onset:  2014  Quality of life:  Good  Prior level of function:  Prior to  no pain  Pain:     Current pain ratin    At best pain ratin    At worst pain ratin  Diagnostic Tests:     X-ray: normal    Activities of Daily Living:     Patient reported ADL status: Limited running tolerance 1/2 mile  Limited with car transfers  Limited sitting tolerance    Patient Goals:     Patient goals for therapy:  Decreased pain and increased strength      Patient is a 32 year old male that presents to therapy with left knee pain. States that symptoms were a possible past knee injury. Reports the pain quality to be sharp/dull, intermittent and are primarily around the left knee cap. Reports that symptoms now not changing. States that aggravating factors are running, jumping.        States that easng factors are unknown.  Denies numbness and tingling, denies any bowel or bladder, no giving way.     History reviewed. No pertinent past medical history.  History  reviewed. No pertinent surgical history.  Social History   Substance Use Topics   • Smoking status: Never Smoker   • Smokeless tobacco: Never Used   • Alcohol use Not on file     Family and Occupational History     Social History   • Marital status:      Spouse name: N/A   • Number of children: N/A   • Years of education: N/A       Objective     Neurological Testing     Sensation     Knee   Left Knee   Intact: light touch    Right Knee   Intact: light touch     Reflexes   Left   Patellar (L4): normal (2+)  Achilles (S1): normal (2+)    Right   Patellar (L4): normal (2+)  Achilles (S1): normal (2+)    Active Range of Motion   Left Knee   Flexion: 140 (pain with full flexion) degrees   Extension: 0 degrees     Right Knee   Flexion: 140 degrees   Extension: 0 degrees     Passive Range of Motion   Left Knee   Flexion: 145 (pain) degrees   Extension: 0 degrees     Right Knee   Flexion: 145 degrees   Extension: 0 degrees     Patellar Static Positioning     Additional Patellar Static Positioning Details  Despite hypomobility patella still slightly deviants to the lateral side with contraction    Patellar Mobility   Left Knee Patellar tendons within functional limits include the medial and inferior. Hypomobile in the left lateral and left superior patellar tendon(s).     Right Knee Patellar tendons within functional limits include the medial, lateral, superior and inferior.     Strength:      Left Hip   Planes of Motion   Abduction: 3+  Adduction: 4-    Right Hip   Planes of Motion   Abduction: 4  Adduction: 4-    Left Knee   Flexion: 4  Extension: 5    Right Knee   Flexion: 4+  Extension: 5    Left Ankle/Foot   Dorsiflexion: 5  Plantar flexion: 5    Right Ankle/Foot   Dorsiflexion: 5  Plantar flexion: 5    Additional Strength Details  Glut med B 3+/5    Tests     Left Knee   Positive lateral Debbi and patellar compression.   Negative anterior drawer, anterior Lachman, medial Debbi, patellar apprehension,  valgus stress test at 0 degrees, valgus stress test at 30 degrees, varus stress test at 0 degrees and varus stress test at 30 degrees.     Right Knee   Negative anterior drawer, anterior Lachman, lateral Debbi, medial Debbi, patellar compression, valgus stress test at 0 degrees, valgus stress test at 30 degrees, varus stress test at 0 degrees and varus stress test at 30 degrees.     Additional Tests Details  Rep ext testing (-)  Rep flex testing (-)  Lumbar screen (-)  Ambulation     Observational Gait   Gait: circumduction   Stride length, left stance time, left swing time and left step length within functional limits. Decreased walking speed. Stride width: WFL.    Left foot contact pattern: foot flat  Right foot contact pattern: foot flat  Left arm swing: within functional limits    Additional Observational Gait Details  Slight knee valgus on L, too many toe sign, slight forward lean    LEFS: 53/80  LBDI: 32 (one not answered)      Therapeutic Exercises:     1. VMO sets , x10    2. Clams, x15    3. Basic tra Edu, x5min    4. Trial press ups, x10 as screen dc if pain increases        Assessment, Response and Plan:   Impairments: abnormal ADL function, abnormal gait, activity intolerance, impaired physical strength and pain with function    Assessment details:  Patient presents with signs and symptoms consistent with a patellar femoral disorder. Patient will be trailed with a lower extremity alignment and strength training program. Patient also notes that his lower back pain is also limiting his function. If you would like me to evaluate and treat his lumbar spine please indicate with a prescription.   Prognosis: good    Goals:   Short Term Goals:   1) Patient's knee symptoms will improve to allow for sitting > 5min for occupational tasks.  2) Patient's hip abd strength will improve by a half muscle grade to faciliate normal gait mechanics for fitness activities.   Short term goal time span:  2-4 weeks       Long Term Goals:    1) Patient's knee symptoms will decrease to allow patient to run >1/2 mile for fitness activities.   2) Patient's LEFS score will improve by 15 points to indicate an improvement in function.  Long term goal time span:  6-8 weeks    Plan:   Therapy options:  Physical therapy treatment to continue  Planned therapy interventions:  Home exercise program, neuromuscular re-education, manual therapy, therapeutic activities, therapeutic exercise, modalities and gait training  Frequency:  2x week  Duration in weeks:  8  Discussed with:  Patient  Plan details:  Proceed with lower extremity strength training and alignment program.      Functional Limitation G-Codes and Severity Modifiers  Current: Mobility: Current (): CJ   Goal: Mobility: Goal (): CI       Referring provider co-signature:  I have reviewed this plan of care and my co-signature certifies the need for services.  Certification Dates:   From 10/25/17     To 1/12/17    Physician Signature: ________________________________ Date: ______________

## 2017-11-01 ENCOUNTER — PHYSICAL THERAPY (OUTPATIENT)
Dept: PHYSICAL THERAPY | Facility: REHABILITATION | Age: 32
End: 2017-11-01
Attending: FAMILY MEDICINE
Payer: OTHER GOVERNMENT

## 2017-11-01 DIAGNOSIS — R26.89 DECREASED MOBILITY: ICD-10-CM

## 2017-11-01 DIAGNOSIS — M25.862 PATELLOFEMORAL DYSFUNCTION, LEFT: ICD-10-CM

## 2017-11-01 DIAGNOSIS — M25.562 LEFT KNEE PAIN, UNSPECIFIED CHRONICITY: ICD-10-CM

## 2017-11-01 PROCEDURE — 97110 THERAPEUTIC EXERCISES: CPT

## 2017-11-01 PROCEDURE — 97112 NEUROMUSCULAR REEDUCATION: CPT

## 2017-11-01 PROCEDURE — 97140 MANUAL THERAPY 1/> REGIONS: CPT

## 2017-11-01 NOTE — OP THERAPY DAILY TREATMENT
Outpatient Physical Therapy  DAILY TREATMENT     [facility] [place]  [address], [address]  [place] [address]  Phone:  [phone]  Fax:  [phone]    Date: 11/01/2017    Patient: Roshan Bailey  YOB: 1985  MRN: [id number]     Time Calculation  Start time: 0900  Stop time: 1000 Time Calculation (min): 60 minutes     Chief Complaint: Knee Problem    Visit #: 2    Subjective  Patient reports that tape increased his pain. States that symptoms were worse with tape. Notes that symptoms returned to baseline after ex. Notes 4/10 knee pain.     Objective     Tests     Left Knee   Positive patellar compression and patella-femoral grind.   Negative anterior drawer, anterior Lachman, Apley's compression, Apley's distraction, posterior drawer, posterior Lachman, posterior sag, valgus stress test at 0 degrees, valgus stress test at 30 degrees, varus stress test at 0 degrees and varus stress test at 30 degrees.     Additional Tests Details  Waterflow (+/-) for pain no catching  TKE with LE ER decreased pain with activity but did not alter standing baseline.      Therapeutic Exercises:     1. TKE , x10, NC/NE    2. TKF, x10, NC/NE    3. Clams , x15, L3    4. Crab walks, x15, L3    5. Shuttle, x10/x10/x10/10, Trialed with tape, band, ball, regular NC/NE Continued  increased pain    6. Lumbar Screen ex Press up, x10, produce; worse    7. Lumbar flexion screen, x10, decrease; better/NC    8. Quad set with ER, x10, Decreased pain during ex/ NC after    9. ITB roller, x5, HEP    10. Gait training , x5min, focus on alingment     Treatments:    1. Manual therapy, Assisted TKF with IR/ER of tibia, x10, NC/NE    2. Manual therapy, Assisted TKE, x10, NC/NE    3. Manual therapy, McConnel tape for medial push, NC/NE    4. Manual therapy, Patellar mobs, all directions , NC/NE    Total Therapeutic exercise time:30min  Total Neuro re-edu time: 8min  Total Manual therapy time: 20min  Total Modality  time:    Assessment/Response/Plan  Patient's knee was reassessed and the conclusion is still consistent.  Patellar femoral disorder vs internal knee derangement. Patients symptoms were alterd by slight ER of the femur, decreased pain. Patient will proceed with a lower extremity alignment/stabilization program.    Plan: If pain does not demonstrate signs of improvement in 4-6 visits consider MD follow up for additional imaging.

## 2017-11-02 ENCOUNTER — OFFICE VISIT (OUTPATIENT)
Dept: MEDICAL GROUP | Facility: CLINIC | Age: 32
End: 2017-11-02
Payer: OTHER GOVERNMENT

## 2017-11-02 VITALS
DIASTOLIC BLOOD PRESSURE: 76 MMHG | BODY MASS INDEX: 28.42 KG/M2 | SYSTOLIC BLOOD PRESSURE: 118 MMHG | HEART RATE: 68 BPM | TEMPERATURE: 97.8 F | HEIGHT: 71 IN | OXYGEN SATURATION: 98 % | RESPIRATION RATE: 14 BRPM | WEIGHT: 203 LBS

## 2017-11-02 DIAGNOSIS — M54.42 ACUTE MIDLINE LOW BACK PAIN WITH BILATERAL SCIATICA: ICD-10-CM

## 2017-11-02 DIAGNOSIS — M25.862 PATELLOFEMORAL DYSFUNCTION, LEFT: ICD-10-CM

## 2017-11-02 DIAGNOSIS — M54.41 ACUTE MIDLINE LOW BACK PAIN WITH BILATERAL SCIATICA: ICD-10-CM

## 2017-11-02 PROCEDURE — 99214 OFFICE O/P EST MOD 30 MIN: CPT | Performed by: FAMILY MEDICINE

## 2017-11-02 NOTE — LETTER
November 2, 2017         Patient: Roshan Bailey   YOB: 1985   Date of Visit: 11/2/2017           To Whom it May Concern:    Roshan Bailey was seen in my clinic on 11/2/2017. Recommend swimming and cycling instead of running while he is recovering from injury.    If you have any questions or concerns, please don't hesitate to call.        Sincerely,           Dylan Gunderson M.D.  Electronically Signed
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
## 2017-11-03 ENCOUNTER — PHYSICAL THERAPY (OUTPATIENT)
Dept: PHYSICAL THERAPY | Facility: REHABILITATION | Age: 32
End: 2017-11-03
Attending: FAMILY MEDICINE
Payer: OTHER GOVERNMENT

## 2017-11-03 DIAGNOSIS — M25.862 PATELLOFEMORAL DYSFUNCTION, LEFT: ICD-10-CM

## 2017-11-03 DIAGNOSIS — M25.562 LEFT KNEE PAIN, UNSPECIFIED CHRONICITY: ICD-10-CM

## 2017-11-03 DIAGNOSIS — R26.89 DECREASED MOBILITY: ICD-10-CM

## 2017-11-03 PROCEDURE — 97140 MANUAL THERAPY 1/> REGIONS: CPT

## 2017-11-03 PROCEDURE — 97110 THERAPEUTIC EXERCISES: CPT

## 2017-11-04 NOTE — OP THERAPY DAILY TREATMENT
Outpatient Physical Therapy  DAILY TREATMENT     [facility] [place]  [address], [address]  [place] [facility] [address]  Phone:  [phone]  Fax:  [phone]    Date: 11/03/2017    Patient: Roshan Bailey  YOB: 1985  MRN: [id number]     Time Calculation  Start time: 1135  Stop time: 1215 Time Calculation (min): 40 minutes     Chief Complaint: Knee Problem    Visit #: 3    Subjective  Patient reports a slight increase in pain since last visit but now has returned to baseline.    Objective  4/10 pain anterior knee    Therapeutic Exercises:     1. Clams , x10, advance    2. Christine wall slides, x fatigue 2x B    3. VMO SLR at wall, xfatigue    4. HS stretch, [id number]    5. HS roller, x10    6. ITB roller, x10    7. Alignment march, 3x15ft    8. Baisc Tra, x5min    9. Bridge, 5x10    Treatments:    1. Manual therapy, inder Mccoy grade I-III  Total Therapeutic exercise time: 32min  Total Neuro re-edu time:   Total Manual therapy time: 8min  Total Modality time:    Assessment/Response/Plan  Patient responded well to therapy with an overall increase in LE fatigue with no increase in pain. Patient noted a slight improvement in pain upon completion of exercise.    Plan: Evaluate and assess the lumbar spine in the next 1-2 sessions.

## 2017-11-06 ENCOUNTER — PHYSICAL THERAPY (OUTPATIENT)
Dept: PHYSICAL THERAPY | Facility: REHABILITATION | Age: 32
End: 2017-11-06
Attending: FAMILY MEDICINE
Payer: OTHER GOVERNMENT

## 2017-11-06 DIAGNOSIS — M25.562 LEFT KNEE PAIN, UNSPECIFIED CHRONICITY: ICD-10-CM

## 2017-11-06 DIAGNOSIS — M25.862 PATELLOFEMORAL DYSFUNCTION, LEFT: ICD-10-CM

## 2017-11-06 DIAGNOSIS — R26.89 DECREASED MOBILITY: ICD-10-CM

## 2017-11-06 PROCEDURE — 97110 THERAPEUTIC EXERCISES: CPT

## 2017-11-07 NOTE — OP THERAPY DAILY TREATMENT
Outpatient Physical Therapy  DAILY TREATMENT     [facility] [place]  [address], [address]  [place] [address]  Phone:  [phone]  Fax:  [phone]    Date: 11/06/2017    Patient: Roshan Bailey  YOB: 1985  MRN: [id number]     Time Calculation  Start time: 1500  Stop time: 1530 Time Calculation (min): 30 minutes     Chief Complaint: Knee Problem    Visit #: 4    Subjective  Patient reports that symptoms in the knee are not worse secondary to exercise. Patient notes that lower back pain continues to increase.    Objective  3/10 knee pain      Therapeutic Exercises:     1. Stair training , x10    2. Christine wall slides, x fatigue 2x B    3. VMO SLR at wall, xfatigue    4. HS stretch, [id number]    5. Ankle 4 way, x10 each L3    6. Bridge, x10    7. Alignment march, 3x15ft    8. Extension to 10deg trial, d/c if pain increases     Total Therapeutic exercise time: 30min  Total Neuro re-edu time:   Total Manual therapy time:  Total Modality time:    Assessment/Response/Plan  Patient is now able to perform his exercise program without an increase in pain. Patient's lumbar spine will be assessed next visit.     Plan: Evaluate lower back pain at next session

## 2017-11-10 ENCOUNTER — PHYSICAL THERAPY (OUTPATIENT)
Dept: PHYSICAL THERAPY | Facility: REHABILITATION | Age: 32
End: 2017-11-10
Attending: FAMILY MEDICINE
Payer: OTHER GOVERNMENT

## 2017-11-10 DIAGNOSIS — M54.42 BILATERAL LOW BACK PAIN WITH BILATERAL SCIATICA, UNSPECIFIED CHRONICITY: ICD-10-CM

## 2017-11-10 DIAGNOSIS — M54.41 BILATERAL LOW BACK PAIN WITH BILATERAL SCIATICA, UNSPECIFIED CHRONICITY: ICD-10-CM

## 2017-11-10 DIAGNOSIS — R26.89 DECREASED MOBILITY: ICD-10-CM

## 2017-11-10 PROCEDURE — 97110 THERAPEUTIC EXERCISES: CPT

## 2017-11-10 PROCEDURE — 97161 PT EVAL LOW COMPLEX 20 MIN: CPT

## 2017-11-10 PROCEDURE — G8978 MOBILITY CURRENT STATUS: HCPCS | Mod: CJ

## 2017-11-10 PROCEDURE — G8979 MOBILITY GOAL STATUS: HCPCS | Mod: CI

## 2017-11-10 SDOH — ECONOMIC STABILITY: GENERAL: QUALITY OF LIFE: GOOD

## 2017-11-10 ASSESSMENT — ENCOUNTER SYMPTOMS
PAIN SCALE: 4
PAIN SCALE AT LOWEST: 2
PAIN SCALE AT HIGHEST: 6

## 2017-11-10 NOTE — OP THERAPY EVALUATION
Outpatient Physical Therapy  INITIAL EVALUATION    [facility] [place]  [address], [address]  [place] [address]  Phone:  [phone]  Fax:  [phone]    Date of Evaluation: 11/10/2017    Patient: Roshan Bailey  YOB: 1985  MRN: [id number]     Referring Provider: Dylan Gunderson M.D.  [address] [place] [address]   Referring Diagnosis Lumbago with sciatica, left side [M54.42];Lumbago with sciatica, right side [M54.41]     Time Calculation  Start time: 1130  Stop time: 1230 Time Calculation (min): 60 minutes     Physical Therapy Occurrence Codes    Date of onset of impairment:  10/2/17   Date physical therapy care plan established or reviewed:  11/10/17   Date physical therapy treatment started:  11/10/17             Chief Complaint: Back Injury    Visit Diagnoses     ICD-10-CM   1. Decreased mobility R26.89   2. Bilateral low back pain with bilateral sciatica, unspecified chronicity M54.42    M54.41         Subjective:   History of Present Illness:     Date of onset:  10/2/2017  Quality of life:  Good  Pain:     Current pain ratin    At best pain ratin    At worst pain ratin  Diagnostic Tests:     X-ray: normal    Activities of Daily Living:     Patient reported ADL status: Unable to run  Unable to stand>  10min  Unable to sit 5min  Limited with house cleaning due to pain, states he has to force through the pain    Patient Goals:     Patient goals for therapy:  Decreased pain, increased strength and return to sport/leisure activities    Patient is a 32 y.o. male that presents to therapy with lower back pain. States that symptoms were due to injury, running. Reports the pain quality to be mostly dull and constant with intermittent bouts of sharp pain. Reports that symptoms now not changing. States that aggravating factors are sitting and standing. States that easng factors are stretching relief for minutes. Notes minor diffiuclty with  urninatin but not conistent. Denies numbness and tingling.     History reviewed. No pertinent past medical history.  History reviewed. No pertinent surgical history.  Social History   Substance Use Topics   • Smoking status: Never Smoker   • Smokeless tobacco: Never Used   • Alcohol use Not on file     Family and Occupational History     Social History   • Marital status:      Spouse name: N/A   • Number of children: N/A   • Years of education: N/A       Objective     Postural Observations  Seated posture: poor  Standing posture: poor  Correction of posture: has no consistent effect        Hip Screen   Hip range of motion within functional limits with the following exceptions: Noted that hip flexor stretching did decrease symptoms    Neurological Testing     Reflexes   Left   Patellar (L4): normal (2+)  Achilles (S1): normal (2+)  Ankle clonus reflex: negative  Babinski sign: negative    Right   Patellar (L4): normal (2+)  Achilles (S1): normal (2+)  Ankle clonus reflex: negative  Babinski sign: negative    Myotome testing   Lumbar (left)   L3 (knee extensors): 5  L4 (ankle dorsiflexors): 5  L5 (great toe extension): 4  S1 (ankle plantar flexors): 5    Lumbar (right)   L3 (knee extensors): 5  L4 (ankle dorsiflexors): 5  L5 (great toe extension): 4  S1 (ankle plantar flexors): 5    Dermatome testing   Lumbar (left)   All left lumbar dermatomes intact    Lumbar (right)   All right lumbar dermatomes intact    Palpation   Left   No palpable tenderness to the gluteus daphne and piriformis.   Hypertonic in the lumbar paraspinals and quadratus lumborum.   Tenderness of the gluteus medius, iliopsoas, lumbar paraspinals and quadratus lumborum.   Trigger point to iliopsoas.     Right   No palpable tenderness to the gluteus daphne and piriformis.   Hypertonic in the lumbar paraspinals and quadratus lumborum.   Tenderness of the gluteus medius, iliopsoas, lumbar paraspinals and quadratus lumborum.   Trigger point to  iliopsoas.     Active Range of Motion     Lumbar   Flexion: Lumbar active flexion: 110deg.  Extension: Lumbar active extension: 30deg.  Left lateral flexion: Left lateral lumbar spine flexion: 45deg.  Right lateral flexion: Right lateral lumbar spine flexion: 45deg.    Joint Play   Spine     Central PA [place]        T10: WFL       T11: WFL       T12: WFL and painful       L1: painful and WFL       L2: WFL and painful       L3: WFL and painful       L4: hypomobile and painful       L5: hypomobile and painful    Left Hip     Posterior hip: within functional limits    Anterior hip: within functional limits    Lateral hip: within functional limits    Long axis distraction: within functional limits    Right Hip     Posterior hip: within functional limits    Anterior hip: within functional limits    Lateral hip: within functional limits    Long axis distraction: within functional limits    Strength:      Left Hip   Planes of Motion   Flexion: 4+ (Pain)  Abduction: 4  Adduction: 4    Right Hip   Planes of Motion   Flexion: 4+ (Pain)  Abduction: 4  Adduction: 4    Left Knee   Flexion: 4+  Extension: 5    Right Knee   Flexion: 5  Extension: 5    Left Ankle/Foot   Dorsiflexion: 5  Plantar flexion: 5    Right Ankle/Foot   Dorsiflexion: 5  Plantar flexion: 5    Tests       Lumbar spine (left)      Negative slump.   Lumbar spine (right)     Negative slump.     Left Pelvic Girdle/Sacrum   Negative: downslip, sacral thrust, thigh thrust and upslip.     Right Pelvic Girdle/Sacrum   Negative: downslip, sacral thrust, thigh thrust and upslip.     Left Hip   Positive SI compression.   Negative Gaenslen's, inflare and outflare.   SLR: Negative.     Right Hip   Positive SI compression.   Negative Gaenslen's, inflare and outflare.   SLR: Negative.     Additional Tests Details  Traction (-)      Paddy LumbarTest     Lying repeated motions:   Pre-test symptoms: 3/10 pain from gluts to mid back  Flexion in lying     Symptoms during  testing: no effect    Symptoms after testing: no effect  Repeat flexion in lying     Symptoms during testing: no effect    Symptoms after testing: worse  Extension in lying     Symptoms during testing: produces    Symptoms after testing: no effect  Repeat extension in lying     Symptoms during testing: produces    Symptoms after testing: worse  Ambulation     Observational Gait   Decreased walking speed.     Additional Observational Gait Details  Slight forward lean   LBDI: 44      Therapeutic Exercises:     1. Basic Tra , x5min    2. Hip flexior stretch, [id number]    3. Supine hip flexor stretch, [id number]    Total Therapeutic exercise time: 10min  Total Neuro re-edu time:   Total Manual therapy time:  Total Modality time:    Assessment, Response and Plan:   Impairments: abnormal ADL function, abnormal muscle firing, abnormal or restricted ROM, activity intolerance, impaired functional mobility, limited mobility and pain with function    Assessment details:  Patient presents with signs and symptoms consistent with a lumbar strain with hip flexor involvement. Patient will be trailed with a lumbar stabilization program with emphasis on hip ROM. If patient's symptoms fail to improve consider additional imaging.   Prognosis: fair    Goals:   Short Term Goals:   1) Patient's lower back symptoms will decrease to allow for sitting >5min for eating tasks.  2) Patient's hip abd strength will improve by a half muscle grade to facilitate improved gait mechanics.   Short term goal time span:  2-4 weeks      Long Term Goals:    1) Patient's lower back symptoms will decrease to allow for light jogging >1/2 mile.  2) Patient's LBDI score will improve by 50% to indicate improvement in function.   Long term goal time span:  6-8 weeks    Plan:   Therapy options:  Physical therapy treatment to continue  Planned therapy interventions:  Gait training, neuromuscular re-education, therapeutic exercise, home exercise program, manual therapy,  patient education, ergonomics, modalities and therapeutic activities  Frequency:  2x week  Duration in weeks:  6  Plan details:  Progress with lumbar stabilization with emphasis on hip ext ROM.      Functional Limitation G-Codes and Severity Modifiers  Current: Mobility: Current (): CJ   Goal: Mobility: Goal (): CI   Codes from prior knee evaluation.     Referring provider co-signature:  I have reviewed this plan of care and my co-signature certifies the need for services.  Certification Dates:   From 11/10/17     To 1/18/18    Physician Signature: ________________________________ Date: ______________

## 2017-11-13 ENCOUNTER — PHYSICAL THERAPY (OUTPATIENT)
Dept: PHYSICAL THERAPY | Facility: REHABILITATION | Age: 32
End: 2017-11-13
Attending: FAMILY MEDICINE
Payer: OTHER GOVERNMENT

## 2017-11-13 DIAGNOSIS — M54.42 ACUTE MIDLINE LOW BACK PAIN WITH BILATERAL SCIATICA: ICD-10-CM

## 2017-11-13 DIAGNOSIS — M54.41 ACUTE MIDLINE LOW BACK PAIN WITH BILATERAL SCIATICA: ICD-10-CM

## 2017-11-13 PROCEDURE — 97140 MANUAL THERAPY 1/> REGIONS: CPT

## 2017-11-13 PROCEDURE — 97110 THERAPEUTIC EXERCISES: CPT

## 2017-11-13 PROCEDURE — 97112 NEUROMUSCULAR REEDUCATION: CPT

## 2017-11-14 NOTE — OP THERAPY DAILY TREATMENT
Outpatient Physical Therapy  DAILY TREATMENT     [facility] [place]  [address]  [place] [address]  Phone:  [phone]  Fax:  [phone]    Date: 11/13/2017    Patient: Roshan Bailey  YOB: 1985  MRN: [id number]     Time Calculation    Total time: 60min  Time in: 300pm  Time out: 400pm     Chief Complaint: Back Problem    Visit #: 2    Subjective  Patient reports 4/10 lower back pain. MT last visit decreased pain for about 2 hours.    Objective  Lumbar extension 30deg still painful      Therapeutic Exercises:     1. Supine hip flexor stretch , [id number]    2. Kneeling hip flexor stretch, [id number]    3. Prone quad stretch , [id number]    4. QL stretch, 5f20lca    5. HS march, 3x20ft    6. Supine ball bridge, 2x10    7. Basic TrA, x5min    8. Plunger, x20    Treatments:    1. Manual therapy, Psoas release    2. Manual therapy, Flexion rot mobs, NC NE    3. Manual therapy, Prone gapping L2-L5, NC : NE    4. Manual therapy, Supine traction, NC: NE    5. Electrical stimulation (interferential current), x15min, lower back, 80-150hz with HP    Total Therapeutic exercise time: 30min  Total Neuro re-edu time:   Total Manual therapy time: 15min  Total Modality time: 15min     Assessment/Response/Plan  Patient responded well to MT with a decrease in pain to 2/10. No change post IFC.    Plan: Continue with ROM program.

## 2017-11-15 ENCOUNTER — PHYSICAL THERAPY (OUTPATIENT)
Dept: PHYSICAL THERAPY | Facility: REHABILITATION | Age: 32
End: 2017-11-15
Attending: FAMILY MEDICINE
Payer: OTHER GOVERNMENT

## 2017-11-15 DIAGNOSIS — M54.42 ACUTE MIDLINE LOW BACK PAIN WITH BILATERAL SCIATICA: ICD-10-CM

## 2017-11-15 DIAGNOSIS — R26.89 DECREASED MOBILITY: ICD-10-CM

## 2017-11-15 DIAGNOSIS — M54.41 ACUTE MIDLINE LOW BACK PAIN WITH BILATERAL SCIATICA: ICD-10-CM

## 2017-11-15 PROCEDURE — 97110 THERAPEUTIC EXERCISES: CPT

## 2017-11-15 PROCEDURE — 97014 ELECTRIC STIMULATION THERAPY: CPT

## 2017-11-15 NOTE — OP THERAPY DAILY TREATMENT
Outpatient Physical Therapy  DAILY TREATMENT     [facility] [place]  [address]  [place] [address]  Phone:  [phone]  Fax:  [phone]    Date: 11/15/2017    Patient: Roshan Bailey  YOB: 1985  MRN: [id number]     Time Calculation  Start time: 1530  Stop time: 1615 Time Calculation (min): 45 minutes     Chief Complaint: Back Problem    Visit #: 3    SUBJECTIVE:  Patient reports no real change in symptoms. States that he may have slept wrong. Notes increased upper back and neck pain.    OBJECTIVE:  Current objective measures: 15deg extension still painful      Therapeutic Exercises (CPT 57783):     Total treatment time spent on Therapeutic Exercises: 20 minutes.      Exercise Description: Full prone press ups       Details: x10       Comments: Increased pain    Exercise Description: Standing SG       Details: x10       Comments: NC/NE    Exercise Description: Standing flexion       Details: x10       Comments: Slight decrease in pain    Exercise Description: Sustatined flexion       Details: x3min DKTC       Comments: Decreased pain to baseline    Therapeutic Treatments and Modalities:     Treatment/Modality: Mechanical Traction (CPT 02457)       Duration: 3min minutes       Location: Lumbar       Comment: 55/35 lbs 45/25sec  DNT    Treatment/Modality: E Stim Unattended (CPT 11521)       Duration: 15min minutes       Location: Lumbar       Comment: 80-150hz with HP      Pain rating before treatment: 3  Pain rating after treatment: 3    ASSESSMENT:   Response to treatment: Patient continues to present with significant muscle guarding and a poor response to ROM. Patient will continue to be progressed with a lumbar stabilization program with a flexion bias. It would be beneficial for patient to be evaluated for possible trigger point injections to decrease resting muscle tone.    PLAN/RECOMMENDATIONS:   Plan for treatment: therapy treatment to continue  next visit. Follow up with MD chapman; trigger point injections   Planned interventions for next visit: continue with stabilization program, flexion bias.

## 2017-11-22 ENCOUNTER — PHYSICAL THERAPY (OUTPATIENT)
Dept: PHYSICAL THERAPY | Facility: REHABILITATION | Age: 32
End: 2017-11-22
Attending: FAMILY MEDICINE
Payer: OTHER GOVERNMENT

## 2017-11-22 DIAGNOSIS — M54.41 ACUTE MIDLINE LOW BACK PAIN WITH BILATERAL SCIATICA: ICD-10-CM

## 2017-11-22 DIAGNOSIS — M54.42 ACUTE MIDLINE LOW BACK PAIN WITH BILATERAL SCIATICA: ICD-10-CM

## 2017-11-22 DIAGNOSIS — R26.89 DECREASED MOBILITY: ICD-10-CM

## 2017-11-22 PROCEDURE — 97140 MANUAL THERAPY 1/> REGIONS: CPT

## 2017-11-22 PROCEDURE — 97014 ELECTRIC STIMULATION THERAPY: CPT

## 2017-11-22 NOTE — OP THERAPY DAILY TREATMENT
Outpatient Physical Therapy  DAILY TREATMENT     [facility] Outpatient [facility] Therapy [place]  [address]  [place] [address]  Phone:  [phone]  Fax:  [phone]    Date: 11/22/2017    Patient: Roshan Bailey  YOB: 1985  MRN: [id number]     Time Calculation  Start time: 0900  Stop time: 0930 Time Calculation (min): 30 minutes     Chief Complaint: Back Problem    Visit #: 4    SUBJECTIVE:  Notes a significant increase in pain secondary to travel and prolonged walking/standing.     OBJECTIVE:  Current objective measures: Lumbar extension 10deg      Therapeutic Exercises (CPT 20138):     Total treatment time spent on Therapeutic Exercises: 5 minutes.      1. Supine flexion over ball, x2min    2. Supine hip flexor stretch, [id number]    Therapeutic Treatments and Modalities:     1. Manual Therapy (CPT 21783), 10 minutes, QL/paraspinals, TP/STM    2. Manual Therapy (CPT 26686), 5 minutes, Supine PA mobs, Grade I-III (cavitation noted)    3. E Stim Attended (CPT 04963), 15 minutes, Lower back, IFC 80-150hz with hot pack      Pain rating before treatment: 6  Pain rating after treatment: 5    ASSESSMENT:   Response to treatment: Patient responded poor to manual therapy TP release with an increase in thoracic back pain. Patient did experience relief with a supine PA mobilization. Patient noted a slight drop in pain post IFC. Patient guarding continues to limit further evaluation of the lumbar spine. Patient will trial dry needling next visit.     PLAN/RECOMMENDATIONS:   Plan for treatment: patient to trial dry needling .  Planned interventions for next visit: to be determined by next therapist.

## 2017-12-04 ENCOUNTER — APPOINTMENT (OUTPATIENT)
Dept: MEDICAL GROUP | Facility: PHYSICIAN GROUP | Age: 32
End: 2017-12-04
Payer: OTHER GOVERNMENT

## 2017-12-05 ENCOUNTER — PHYSICAL THERAPY (OUTPATIENT)
Dept: PHYSICAL THERAPY | Facility: REHABILITATION | Age: 32
End: 2017-12-05
Attending: FAMILY MEDICINE
Payer: OTHER GOVERNMENT

## 2017-12-05 DIAGNOSIS — M54.42 ACUTE MIDLINE LOW BACK PAIN WITH BILATERAL SCIATICA: ICD-10-CM

## 2017-12-05 DIAGNOSIS — M54.41 ACUTE MIDLINE LOW BACK PAIN WITH BILATERAL SCIATICA: ICD-10-CM

## 2017-12-05 PROCEDURE — 97140 MANUAL THERAPY 1/> REGIONS: CPT

## 2017-12-05 PROCEDURE — 97014 ELECTRIC STIMULATION THERAPY: CPT

## 2017-12-05 NOTE — OP THERAPY DAILY TREATMENT
Outpatient Physical Therapy  DAILY TREATMENT     [facility] Physical Therapy [address]  [address]  [place] [address]  Phone:  [phone]  Fax:  [phone]    Date: 12/05/2017    Patient: Roshan Bailey  YOB: 1985  MRN: [id number]     Time Calculation  Start time: 0925  Stop time: 1015 Time Calculation (min): 50 minutes     Chief Complaint: Back Problem    Visit #: 5    SUBJECTIVE:  I am not sure the therapy is helping     OBJECTIVE:      Therapeutic Exercises (CPT 56569):     1. Patient agrees to risks and benefits associated with IDN    2. Patients skin cleaned and prepped according to protocol    3. HP inferior glut    4. Paravertebral L1-L5    5. Estim to needles with heat x 15 minutes     6. No adverse effects noted post treatment    Ball wall squats 2 x10   Bridge on swiss ball 2 x 10     Pain rating before treatment: 3  Pain rating after treatment: 1    ASSESSMENT:   Response to treatment: Patient tolerated needling well.  Poor posture and discussed neutral spine in all positions with patient.     PLAN/RECOMMENDATIONS:   Plan for treatment - continue with needling.   Plan for one more session of needling. Patient demonstrates posterior chain weakness and poor postural awareness.

## 2017-12-08 ENCOUNTER — APPOINTMENT (OUTPATIENT)
Dept: PHYSICAL THERAPY | Facility: REHABILITATION | Age: 32
End: 2017-12-08
Attending: FAMILY MEDICINE
Payer: OTHER GOVERNMENT

## 2017-12-08 DIAGNOSIS — M54.42 ACUTE MIDLINE LOW BACK PAIN WITH BILATERAL SCIATICA: ICD-10-CM

## 2017-12-08 DIAGNOSIS — M54.41 ACUTE MIDLINE LOW BACK PAIN WITH BILATERAL SCIATICA: ICD-10-CM

## 2017-12-08 NOTE — PROGRESS NOTES
1. Acute midline low back pain with bilateral sciatica  MR-LUMBAR SPINE-W/O     Received a call from physical therapy  Patient is not responding to therapy  POSITIVE bilateral radiculopathy  Symptoms now for greater than 2 months  Obtain MRI of the lumbar spine in follow-up after MRI to discuss results  We will send patient a message with plan via my chart

## 2017-12-19 ENCOUNTER — HOSPITAL ENCOUNTER (OUTPATIENT)
Dept: RADIOLOGY | Facility: MEDICAL CENTER | Age: 32
End: 2017-12-19
Attending: FAMILY MEDICINE
Payer: OTHER GOVERNMENT

## 2017-12-19 DIAGNOSIS — M54.42 ACUTE MIDLINE LOW BACK PAIN WITH BILATERAL SCIATICA: ICD-10-CM

## 2017-12-19 DIAGNOSIS — M54.41 ACUTE MIDLINE LOW BACK PAIN WITH BILATERAL SCIATICA: ICD-10-CM

## 2017-12-19 PROCEDURE — 72148 MRI LUMBAR SPINE W/O DYE: CPT

## 2017-12-28 ENCOUNTER — OFFICE VISIT (OUTPATIENT)
Dept: MEDICAL GROUP | Facility: CLINIC | Age: 32
End: 2017-12-28
Payer: OTHER GOVERNMENT

## 2017-12-28 VITALS
HEIGHT: 71 IN | SYSTOLIC BLOOD PRESSURE: 116 MMHG | TEMPERATURE: 97.5 F | WEIGHT: 203 LBS | BODY MASS INDEX: 28.42 KG/M2 | HEART RATE: 66 BPM | RESPIRATION RATE: 14 BRPM | OXYGEN SATURATION: 96 % | DIASTOLIC BLOOD PRESSURE: 74 MMHG

## 2017-12-28 DIAGNOSIS — M25.862 PATELLOFEMORAL DYSFUNCTION, LEFT: ICD-10-CM

## 2017-12-28 DIAGNOSIS — M51.26 LUMBAR DISC HERNIATION: ICD-10-CM

## 2017-12-28 PROCEDURE — 99213 OFFICE O/P EST LOW 20 MIN: CPT | Performed by: FAMILY MEDICINE

## 2017-12-28 NOTE — LETTER
December 28, 2017         Patient: Roshan Bailey   YOB: 1985   Date of Visit: 12/28/2017           To Whom it May Concern:    Roshan Bailey was seen in my clinic on 12/28/2017. He may return to work, but recommend avoiding running for 1 month unless cleared sooner by physician.    If you have any questions or concerns, please don't hesitate to call.        Sincerely,           Dylan Gunderson M.D.  Electronically Signed

## 2017-12-28 NOTE — PROGRESS NOTES
1. Lumbar disc herniation  REFERRAL TO PHYSIATRY (PMR)   2. Patellofemoral dysfunction, left       Patient is not responding to Physical therapy  POSITIVE bilateral radiculopathy  Symptoms now for greater than 2 months  POSITIVE findings on MRI of the lumbar spine  Referral to physiatry for consideration of JUSTIN    12/19/2017 7:32 AM    HISTORY/REASON FOR EXAM:  Low Back Pain  lbp for last 2 months    TECHNIQUE/EXAM DESCRIPTION:  MRI of the lumbar spine without contrast.    The study was performed on a [employer] Signa 1.5 Marcy MRI scanner.  T1 sagittal, T2 fast spin-echo sagittal, and T2 axial images were obtained of the lumbar spine.    COMPARISON:  10/12/2017.    FINDINGS:    Alignment is anatomic.    The vertebral body heights are preserved.    Bone marrow signal is normal.    Conus is normal in caliber, signal, and location at L1.    Retroperitoneal and paravertebral soft tissues are normal.    L1-2:  No posterior disc contour abnormality. No facet arthropathy.  Patent spinal canal. Patent neuroforamen bilaterally.    L2-3:  No posterior disc contour abnormality. No facet arthropathy.  Patent spinal canal. Patent neuroforamen bilaterally.    L3-4:  No posterior disc contour abnormality. No facet arthropathy.  Patent spinal canal. Patent neuroforamen bilaterally.    L4-5:  No posterior disc contour abnormality. No facet arthropathy.  Patent spinal canal. Patent neuroforamen bilaterally.    L5-S1: Mild diffuse disc bulge with annular fissure. No facet arthropathy.  Patent spinal canal. Mild to moderate narrowing of the bilateral neuroforamina, right more than left.    Five non-rib bearing lumbar vertebral bodies are assumed with the L5 vertebral body articulating with the sacrum. Accurate numbering of the spine levels would require imaging of the thoracolumbar spine to count ribs.     Impression       Mild degenerative disc disease and annular fissure at L5-S1 with mild to moderate narrowing of the bilateral L5-S1  neuroforamina, right more than left. No spinal canal stenosis.     Interpreted in the office today with the patient    Return if symptoms worsen or fail to improve. Otherwise f/u with Physiatry    Thank you Damian Cooper PA-C for allowing me to participate in caring for your patient.

## 2018-01-09 ENCOUNTER — PHYSICAL THERAPY (OUTPATIENT)
Dept: PHYSICAL THERAPY | Facility: REHABILITATION | Age: 33
End: 2018-01-09
Attending: FAMILY MEDICINE
Payer: OTHER GOVERNMENT

## 2018-01-09 DIAGNOSIS — M25.862 PATELLOFEMORAL DYSFUNCTION, LEFT: ICD-10-CM

## 2018-01-09 DIAGNOSIS — M25.562 LEFT KNEE PAIN, UNSPECIFIED CHRONICITY: ICD-10-CM

## 2018-01-09 DIAGNOSIS — R26.89 DECREASED MOBILITY: ICD-10-CM

## 2018-01-09 PROCEDURE — 97110 THERAPEUTIC EXERCISES: CPT

## 2018-01-09 PROCEDURE — G8979 MOBILITY GOAL STATUS: HCPCS | Mod: CI

## 2018-01-09 PROCEDURE — G8978 MOBILITY CURRENT STATUS: HCPCS | Mod: CJ

## 2018-01-09 PROCEDURE — 97014 ELECTRIC STIMULATION THERAPY: CPT

## 2018-01-09 NOTE — OP THERAPY DAILY TREATMENT
Outpatient Physical Therapy  DAILY TREATMENT     [facility] Outpatient Physical Therapy [place]  [address]  [place] [address]  Phone:  [phone]  Fax:  [phone]    Date: 01/09/2018    Patient: Roshan Bailey  YOB: 1985  MRN: [id number]     Time Calculation  Start time: 0754  Stop time: 0834 Time Calculation (min): 40 minutes     Chief Complaint: Knee Problem    Visit #: 5 for knee ; overall 10 total    SUBJECTIVE:  Patient reports not much change in the knee states he went hiking and noted an overall increase in pain. States he is intermittently doing his exercise program.    OBJECTIVE:  Current objective measures:   Left Knee   Negative lateral Debbi and patellar compression.   Negative anterior drawer, anterior Lachman, medial Debbi, patellar apprehension, valgus stress test at 0 degrees, valgus stress test at 30 degrees, varus stress test at 0 degrees and varus stress test at 30 degrees.      Positive full squat  Full ROM B  MMT 5/5 except: L glut med at 4-/5  And L HS at 4/5  PT Functional Assessment Tool Used: LEFS  PT Functional Assessment Score: 53      Therapeutic Exercises (CPT 40791):     Total treatment time spent on Therapeutic Exercises: 25 minutes.      1. Clams, x15 B    2. Quad sets, x20    3. Seated hip abd, x30 L 3 or abd machine    4. Rep knee flexion, x10 5x day    5. Gait training for neutral alingment , x10min    6. SAQ, x20    Therapeutic Treatments and Modalities:     1. E Stim Unattended (CPT 47505), 15 minutes, L knee, IFC 80-150hz with CP      Pain rating before treatment: 2  Pain rating after treatment: 2    ASSESSMENT:   Response to treatment: Patient did report an increase in pain during exercise that was decreased with IFC. Patient impairment is likely tissue dysfunction vs arthritic change of the patella. Patient will continue to progress with a stabilization program for his knee.     PLAN/RECOMMENDATIONS:   Plan for treatment: therapy  treatment to continue next visit.  Planned interventions for next visit: reasess response to rep. loading.

## 2018-01-09 NOTE — OP THERAPY PROGRESS SUMMARY
Outpatient Physical Therapy  PROGRESS SUMMARY NOTE      [facility] [place]  [address], [address]  [place] [address]  Phone:  [phone]  Fax:  [phone]    Date of Visit: 01/09/2018    Patient: Roshan Bailey  YOB: 1985  MRN: [id number]     Referring Provider: Dylan Gunderson M.D.  [address] [place] [address]   Referring Diagnosis Other specified joint disorders, left knee [M25.862]     Visit Diagnoses     ICD-10-CM   1. Decreased mobility R26.89   2. Patellofemoral dysfunction, left M25.862   3. Left knee pain, unspecified chronicity M25.562       Rehab Potential: fair    Physical Therapy Occurrence Codes    Date of onset of impairment:  1/1/14   Date physical therapy care plan established or reviewed:  10/25/17   Date physical therapy treatment started:  10/25/17        Cert Period: 1/9/18 to 4/9/18  Progress Report Period: 10/25/17 - 1/9/18    Functional Limitation G-Codes and Severity Modifiers  PT Functional Assessment Tool Used: LEFS  PT Functional Assessment Score: 53  Current status: Mobility: Current (): CJ   Goal status: Mobility: Goal (): CI   OBJECTIVE:  Current objective measures:   Left Knee   Negative lateral Debbi and patellar compression.   Negative anterior drawer, anterior Lachman, medial Debbi, patellar apprehension, valgus stress test at 0 degrees, valgus stress test at 30 degrees, varus stress test at 0 degrees and varus stress test at 30 degrees.      Positive full squat  Full ROM B  MMT 5/5 except: L glut med at 4-/5  And L HS at 4/5  PT Functional Assessment Tool Used: LEFS  PT Functional Assessment Score: 53    Progress Summary Details  Patient has now returned to therapy for his knee secondary to referral back to MD for his lower back pain. Patient testing is similar to that of the evaluation. Overall, impression is that pain in his knee maybe do to a contractile dysfunction vs arthritic changes of the  patellar femoral joint. Patient will trial a short stent of tissue loading and if no change will proceed with a heavy stabilization program to prevent further functional decline. Goals to this point have not been met. Overall, 5 knee visits have been utilized.     Recommend patient continue 2x per week for 6 weeks.  Plan:   Therapy options:  Physical therapy treatment to continue  Planned therapy interventions:  Home exercise program, neuromuscular re-education, manual therapy, therapeutic activities, therapeutic exercise, modalities and gait training  Referring provider co-signature:  I have reviewed this plan of care and my co-signature certifies the need for services.    Physician Signature: ________________________________ Date: ______________

## 2018-02-12 ENCOUNTER — PHYSICAL THERAPY (OUTPATIENT)
Dept: PHYSICAL THERAPY | Facility: REHABILITATION | Age: 33
End: 2018-02-12
Attending: FAMILY MEDICINE
Payer: OTHER GOVERNMENT

## 2018-02-12 DIAGNOSIS — M25.862 PATELLOFEMORAL DYSFUNCTION, LEFT: ICD-10-CM

## 2018-02-12 DIAGNOSIS — M25.562 LEFT KNEE PAIN, UNSPECIFIED CHRONICITY: ICD-10-CM

## 2018-02-12 DIAGNOSIS — R26.89 DECREASED MOBILITY: ICD-10-CM

## 2018-02-12 PROCEDURE — 97110 THERAPEUTIC EXERCISES: CPT

## 2018-02-12 PROCEDURE — 97140 MANUAL THERAPY 1/> REGIONS: CPT

## 2018-02-12 NOTE — OP THERAPY DAILY TREATMENT
Outpatient Physical Therapy  DAILY TREATMENT     [facility] [place]  [address], [address]  [facility] [address]  Phone:  [phone]  Fax:  [phone]    Date: 02/12/2018    Patient: Roshan Bailey  YOB: 1985  MRN: [id number]     Time Calculation  Start time: 0820  Stop time: 0850 Time Calculation (min): 30 minutes     Chief Complaint: Knee Problem and Back Problem    Visit #: 6    SUBJECTIVE:      OBJECTIVE:  Current objective measures: Continued joint crepitus with full flexion  6/10 full squat        Therapeutic Exercises (CPT 24513):     Total treatment time spent on Therapeutic Exercises: 20 minutes.      1. Prone quad stretch, [id number]    2. Sitting knee extension with OP, x20, NC: NE     3. SLR ER, x fatigue    4. SL hip abd at wall, x20    5. Prone, x4min    Therapeutic Treatments and Modalities:     1. Manual Therapy (CPT 55289), 10 minutes, L quad, STM/Passive quad stretching/TKF with over pressure (noted slight decrease in pain with squat now 4/10)      Pain rating before treatment: 6  Pain rating after treatment: 4    ASSESSMENT:   Response to treatment: Patient seemed to respond well to TKF with overpressure to decrease pain with squatting. This will be trailed x1 week. If symptoms fail to improve consider referral to ortho for further follow up.    PLAN/RECOMMENDATIONS:   Plan for treatment: therapy treatment to continue next visit.  Planned interventions for next visit: reassess resonse to TKF.

## 2018-02-21 ENCOUNTER — TELEPHONE (OUTPATIENT)
Dept: PHYSICAL THERAPY | Facility: REHABILITATION | Age: 33
End: 2018-02-21

## 2018-02-21 NOTE — OP THERAPY DISCHARGE SUMMARY
Outpatient Physical Therapy  DISCHARGE SUMMARY NOTE      [facility] [place]  [address], [address]  [facility] [address]  Phone:  [phone]  Fax:  [phone]    Date of Visit: 02/21/2018    Patient: Roshan Bailey  YOB: 1985  MRN: [id number]     Referring Provider: Dylan Gunderson M.D.   Referring Diagnosis Other specified joint disorders, left knee [M25.862] LBP M54.4               Your patient is being discharged from Physical Therapy with the following comments:   · Goals not met    Comments:  Patient has now been seen for  10 total visits and has made no functional improvement at this time. Patient is to follow up with physiatry. Patient has a home program and will continue to perform his program. Patient should be given another referral for PT once pain is controlled.     Limitations Remaining:  Continued knee and lower back pain.     Recommendations:  Discharge patient to home program at this time with high probability of return once pain is controlled. Patient may benefit from additional imaging on his knee if pain remains uncontrolled. Suspect patellar arthritic change.     Paulo Luciano, PT, DPT    Date: 2/21/2018

## 2018-02-26 ENCOUNTER — OFFICE VISIT (OUTPATIENT)
Dept: PHYSICAL MEDICINE AND REHAB | Facility: MEDICAL CENTER | Age: 33
End: 2018-02-26
Payer: OTHER GOVERNMENT

## 2018-02-26 VITALS
SYSTOLIC BLOOD PRESSURE: 112 MMHG | OXYGEN SATURATION: 97 % | HEART RATE: 51 BPM | TEMPERATURE: 96.6 F | DIASTOLIC BLOOD PRESSURE: 72 MMHG

## 2018-02-26 DIAGNOSIS — M47.816 LUMBAR SPONDYLOSIS: ICD-10-CM

## 2018-02-26 DIAGNOSIS — M54.50 LUMBOSACRAL PAIN: ICD-10-CM

## 2018-02-26 DIAGNOSIS — M25.562 LEFT KNEE PAIN, UNSPECIFIED CHRONICITY: ICD-10-CM

## 2018-02-26 DIAGNOSIS — M79.18 MYOFASCIAL PAIN: ICD-10-CM

## 2018-02-26 DIAGNOSIS — M51.36 DDD (DEGENERATIVE DISC DISEASE), LUMBAR: ICD-10-CM

## 2018-02-26 PROCEDURE — 99204 OFFICE O/P NEW MOD 45 MIN: CPT | Performed by: PHYSICAL MEDICINE & REHABILITATION

## 2018-02-26 RX ORDER — ACETAMINOPHEN 500 MG
500-1000 TABLET ORAL EVERY 6 HOURS PRN
COMMUNITY
End: 2018-05-22

## 2018-02-26 RX ORDER — IBUPROFEN 200 MG
200 TABLET ORAL EVERY 6 HOURS PRN
COMMUNITY
End: 2018-05-22

## 2018-02-26 ASSESSMENT — ENCOUNTER SYMPTOMS
SPUTUM PRODUCTION: 0
BACK PAIN: 1
MYALGIAS: 1
FEVER: 0
CHILLS: 0
CONSTIPATION: 0
EYE PAIN: 0
CLAUDICATION: 0
DIARRHEA: 0
EYE DISCHARGE: 0
ORTHOPNEA: 0
HEMOPTYSIS: 0

## 2018-02-26 NOTE — LETTER
February 26, 2018        Roshan Bailey  [address] [place] [address]        To Whom This May Concern,     Roshan was seen today regarding low back and left knee pain. I recommend continue on profile, including no running or sit-ups.     I ordered an MRI of the left knee.     I would anticipate he will be able to return to full duty within three to six months.     If you have any questions or concerns, please don't hesitate to call.        Sincerely,        Hermes Dawson M.D.    Electronically Signed

## 2018-02-26 NOTE — PROGRESS NOTES
Subjective:      Roshan Bailey is a 32 y.o. male who presents with New Patient    Chief complaint: Low back pain, left knee pain        HPI   The patient notes the onset of low back and left knee area pain in approximately 10/2017 associated with some physical activities including running, the patient denies antecedent trauma although he has been involved with physical activities in the past, as [employer].    The patient now notes ongoing pain in the left greater than the right lumbosacral region, notes intermittent radiation towards the left posterior thigh, without overt radicular component. The pain limits his ability to function, including standing and running tolerance.    The patient notes ongoing left knee area pain, worse with activities. The patient notes intermittent partial locking symptoms, notes occasional swelling. The left knee pain limits his ability to function, including running tolerance.    Due to the ongoing low back and left knee pain, the patient is currently on profile with [employer], for limited running and sit up activities    The patient has had prior treatment with medications, including NSAIDs. He has been to physical therapy. No acute changes with bowel/bladder noted. No acute changes with strength noted. He is making an effort with home exercise program. The ongoing pain limits his ability to function. He is inquiring about additional treatment options.        MEDICAL RECORDS REVIEW/DATA REVIEW: Reviewed in epic.    Records Reviewed: Reviewed referring provider notes.     I reviewed medications. Tried NSAIDs    I reviewed diagnostic studies:     I reviewed radiographs.     Reviewed MRI lumbar spine 12/2017, images and report, see below. Review lumbar spine x-ray 10/2017, images and report, unremarkable.     Reviewed left knee x-rays 6/2017, images and report, unremarkable    I reviewed lab studies.     I reviewed medical issues.     I reviewed family history: No  neuromuscular disorders noted.    I reviewed social issues. [employer]      12/19/2017 7:32 AM    HISTORY/REASON FOR EXAM:  Low Back Pain  lbp for last 2 months    TECHNIQUE/EXAM DESCRIPTION:  MRI of the lumbar spine without contrast.    The study was performed on a [employer] Signa 1.5 Marcy MRI scanner.  T1 sagittal, T2 fast spin-echo sagittal, and T2 axial images were obtained of the lumbar spine.    COMPARISON:  10/12/2017.    FINDINGS:    Alignment is anatomic.    The vertebral body heights are preserved.    Bone marrow signal is normal.    Conus is normal in caliber, signal, and location at L1.    Retroperitoneal and paravertebral soft tissues are normal.    L1-2:  No posterior disc contour abnormality. No facet arthropathy.  Patent spinal canal. Patent neuroforamen bilaterally.    L2-3:  No posterior disc contour abnormality. No facet arthropathy.  Patent spinal canal. Patent neuroforamen bilaterally.    L3-4:  No posterior disc contour abnormality. No facet arthropathy.  Patent spinal canal. Patent neuroforamen bilaterally.    L4-5:  No posterior disc contour abnormality. No facet arthropathy.  Patent spinal canal. Patent neuroforamen bilaterally.    L5-S1: Mild diffuse disc bulge with annular fissure. No facet arthropathy.  Patent spinal canal. Mild to moderate narrowing of the bilateral neuroforamina, right more than left.    Five non-rib bearing lumbar vertebral bodies are assumed with the L5 vertebral body articulating with the sacrum. Accurate numbering of the spine levels would require imaging of the thoracolumbar spine to count ribs.     Impression       Mild degenerative disc disease and annular fissure at L5-S1 with mild to moderate narrowing of the bilateral L5-S1 neuroforamina, right more than left. No spinal canal stenosis.         PAST MEDICAL HISTORY: History reviewed. No pertinent past medical history.    PAST SURGICAL HISTORY:  History reviewed. No pertinent surgical  history.    ALLERGIES:  Patient has no known allergies.    MEDICATIONS:    Outpatient Encounter Prescriptions as of 2/26/2018   Medication Sig Dispense Refill   • acetaminophen (TYLENOL) 500 MG Tab Take 500-1,000 mg by mouth every 6 hours as needed.     • ibuprofen (MOTRIN) 200 MG Tab Take 200 mg by mouth every 6 hours as needed.     • cyclobenzaprine (FLEXERIL) 10 MG Tab Take 1 Tab by mouth 3 times a day as needed. 30 Tab 0   • naproxen (NAPROSYN) 500 MG Tab Take 1 Tab by mouth 2 times a day, with meals. 60 Tab 1   • Diclofenac Sodium 1 % Gel Apply 2-4 grams to affected area 4 times daily as needed for pain. 1 Tube 0   • [DISCONTINUED] oseltamivir (TAMIFLU) 75 MG Cap Take 1 Cap by mouth 2 times a day. 10 Cap 0   • [DISCONTINUED] ondansetron (ZOFRAN ODT) 4 MG TABLET DISPERSIBLE Take 1 Tab by mouth every four hours as needed for Nausea/Vomiting. 20 Tab 0     No facility-administered encounter medications on file as of 2/26/2018.        SOCIAL HISTORY:    Social History     Social History   • Marital status:      Spouse name: N/A   • Number of children: N/A   • Years of education: N/A     Social History Main Topics   • Smoking status: Never Smoker   • Smokeless tobacco: Never Used   • Alcohol use Yes   • Drug use: No   • Sexual activity: Not on file     Other Topics Concern   •  Service Yes   • Blood Transfusions No   • Caffeine Concern No   • Occupational Exposure No   • Hobby Hazards No   • Sleep Concern Yes   • Stress Concern No   • Weight Concern No   • Special Diet No   • Back Care Yes   • Exercise Yes   • Bike Helmet Yes   • Seat Belt Yes   • Self-Exams No     Social History Narrative   • No narrative on file         Review of Systems   Constitutional: Negative for chills and fever.   HENT: Negative for ear discharge and ear pain.    Eyes: Negative for pain and discharge.   Respiratory: Negative for hemoptysis and sputum production.    Cardiovascular: Negative for orthopnea and claudication.    Gastrointestinal: Negative for constipation and diarrhea.   Genitourinary: Negative for frequency and hematuria.   Musculoskeletal: Positive for back pain, joint pain and myalgias.   Skin: Negative.    Endo/Heme/Allergies: Negative.    All other systems reviewed and are negative.          Objective:     /72   Pulse (!) 51   Temp 35.9 °C (96.6 °F)   SpO2 97%      Physical Exam  Constitutional: oriented to person, place, and time, appears well-developed and well-nourished.   HEENT: Normocephalic atraumatic, neck supple, no JVD noted, no masses noted, no meningeal signs noted  Lymphadenopathy: no cervical, supraclavicular, or inguinal lymphadenopathy noted  Cardiovascular: Intact distal pulses, including at ankles, no limb swelling noted  Pulmonary: No tachypnea noted, no accessory muscle use noted, no dyspnea noted  Abdominal: Soft, nontender, exhibits no distension, no peritoneal signs, no HSM  Musculoskeletal:   Hip: exhibits no significant tenderness. No significant pain with range of motion testing  Lumbar back: exhibits mild decreased range of motion, mild tenderness and mild pain. negative straight leg testing, only mild pain with quadrant loading and extension, mild tenderness sacroiliac joint region, trigger points noted lumbosacral region  Knee: Tender with palpation about left knee, including along joint line and medial/lateral collateral ligaments, no significant swelling noted, no signs of infection, stable with stress testing  Neurological: oriented to person, place, and time. Cranial nerves grossly intact, normal strength. Sensation intact distally. Reflexes 1+ in lower limbs, Gait normal. Able to heel/toe walk, No upper motor neuron signs evident  Skin: Skin is intact. no rashes or lesions noted  Psychiatric: normal mood and affect. speech is normal and behavior is normal. Judgment and thought content normal. Cognition and memory are normal.          Assessment/Plan:       ASSESSMENT:    1.  Lumbosacral pain, myofascial pain, disc bulge with annular tear at L5-S1, foraminal stenosis, degenerative disc disease, spondylosis    - Reviewed injection therapy with trigger point injections to treat the myofascial component to the ongoing pain, some at authorization request      2. Left knee pain, sprain strain, limitations with function, suspect soft tissue pathology    - MR-KNEE-W/O LEFT; Future  - Note, the patient has had ongoing left knee pain for greater than 3 months limiting his ability to function. He has previously tried medications, including NSAIDs, physical therapy, home exercise program, and time. Left knee radiographs were unremarkable, suspect soft tissue pathology      DISCUSSION/PLAN:    - I discussed management options. I reviewed symptomatic care    - Wrote letter recommending patient continue on profile/limited duty with the [employer] wall evaluation treatment and progress.    - I reviewed home exercise program. I reviewed activity modification    - The patient can consider complementary trials with acupuncture, massage therapy, or TENS unit    - I reviewed medication monitoring. For now, continue current medications. I reviewed further symptomatic medications.    - I reviewed additional diagnostic options, including further/advanced imaging, electrodiagnostic testing, and further lab screen    - I reviewed additional therapeutic options, including further injection/interventional therapy and additional consultative input     - Coordinate follow-up after the above noted MRI, getting injection authorization in place, or an as-needed basis      Please note that this dictation was created using voice recognition software. I have made every reasonable attempt to correct obvious errors but there may be errors of grammar and content that I may have overlooked prior to finalization of this note.

## 2018-03-08 ENCOUNTER — HOSPITAL ENCOUNTER (OUTPATIENT)
Dept: RADIOLOGY | Facility: MEDICAL CENTER | Age: 33
End: 2018-03-08
Attending: PHYSICAL MEDICINE & REHABILITATION
Payer: OTHER GOVERNMENT

## 2018-03-08 DIAGNOSIS — M25.562 LEFT KNEE PAIN, UNSPECIFIED CHRONICITY: ICD-10-CM

## 2018-03-08 PROCEDURE — 73721 MRI JNT OF LWR EXTRE W/O DYE: CPT | Mod: LT

## 2018-03-12 ENCOUNTER — OFFICE VISIT (OUTPATIENT)
Dept: PHYSICAL MEDICINE AND REHAB | Facility: MEDICAL CENTER | Age: 33
End: 2018-03-12
Payer: OTHER GOVERNMENT

## 2018-03-12 VITALS
OXYGEN SATURATION: 96 % | BODY MASS INDEX: 28.42 KG/M2 | HEART RATE: 56 BPM | TEMPERATURE: 97.2 F | HEIGHT: 71 IN | DIASTOLIC BLOOD PRESSURE: 68 MMHG | WEIGHT: 203 LBS | SYSTOLIC BLOOD PRESSURE: 114 MMHG

## 2018-03-12 DIAGNOSIS — M79.18 MYOFASCIAL PAIN: ICD-10-CM

## 2018-03-12 DIAGNOSIS — M22.2X2 PATELLOFEMORAL PAIN SYNDROME OF LEFT KNEE: ICD-10-CM

## 2018-03-12 DIAGNOSIS — M25.562 LEFT KNEE PAIN, UNSPECIFIED CHRONICITY: ICD-10-CM

## 2018-03-12 DIAGNOSIS — M54.50 LUMBOSACRAL PAIN: ICD-10-CM

## 2018-03-12 DIAGNOSIS — M22.42 DEGENERATION OF ARTICULAR CARTILAGE OF PATELLA, LEFT: ICD-10-CM

## 2018-03-12 PROCEDURE — 99212 OFFICE O/P EST SF 10 MIN: CPT | Mod: 25 | Performed by: PHYSICAL MEDICINE & REHABILITATION

## 2018-03-12 PROCEDURE — 20553 NJX 1/MLT TRIGGER POINTS 3/>: CPT | Performed by: PHYSICAL MEDICINE & REHABILITATION

## 2018-03-12 RX ORDER — METHYLPREDNISOLONE ACETATE 40 MG/ML
40 INJECTION, SUSPENSION INTRA-ARTICULAR; INTRALESIONAL; INTRAMUSCULAR; SOFT TISSUE ONCE
Status: COMPLETED | OUTPATIENT
Start: 2018-03-12 | End: 2018-03-12

## 2018-03-12 RX ADMIN — METHYLPREDNISOLONE ACETATE 40 MG: 40 INJECTION, SUSPENSION INTRA-ARTICULAR; INTRALESIONAL; INTRAMUSCULAR; SOFT TISSUE at 10:23

## 2018-03-12 NOTE — PROGRESS NOTES
Subjective:      Roshan Bailey presents with Follow-Up        Subjective: Mr. Bailey returns to the office today for follow-up evaluation of spinal/joints/musculoskeletal pain.     The patient notes ongoing pain in the left greater than the right lumbosacral region, notes intermittent radiation towards the left posterior thigh, without overt radicular component. The axial lumbosacral pain is most functional limiting. The pain limits his ability to function, including standing and running tolerance.    The patient notes ongoing left knee area pain, worse with activities. The patient notes intermittent partial locking symptoms, notes occasional swelling. The left knee pain limits his ability to function, including running tolerance.    The patient has had prior treatment with medications, including NSAIDs. He has been to physical therapy. No acute changes with bowel/bladder noted. No acute changes with strength noted. He is making an effort with home exercise program. The ongoing pain limits his ability to function. The patient is inquiring about further treatment options.      MEDICAL RECORDS REVIEW/DATA REVIEW: Reviewed in epic.    I reviewed medications. Tried NSAIDs    I reviewed diagnostic studies:     I reviewed radiographs.    Reviewed MRI of left knee 3/2018, images and report, revealed patellar cartilage fissuring with minimal underlying bone marrow edema, small effusion. Reviewed left knee x-rays 6/2017, unremarkable.     Reviewed MRI lumbar spine 12/2017. Reviewed lumbar spine x-ray 10/2017, unremarkable.     I reviewed lab studies.     I reviewed medical issues.     I reviewed family history: No neuromuscular disorders noted.    I reviewed social issues. [employer], [employer], [employer]; Due to the ongoing low back and left knee pain, the patient is on profile with [employer], for limited running and sit up activities, letter written to/2018 regarding this.      PAST MEDICAL HISTORY: History reviewed. No pertinent  past medical history.    PAST SURGICAL HISTORY:  History reviewed. No pertinent surgical history.    ALLERGIES:  Patient has no known allergies.    MEDICATIONS:    Outpatient Encounter Prescriptions as of 3/12/2018   Medication Sig Dispense Refill   • acetaminophen (TYLENOL) 500 MG Tab Take 500-1,000 mg by mouth every 6 hours as needed.     • ibuprofen (MOTRIN) 200 MG Tab Take 200 mg by mouth every 6 hours as needed.     • cyclobenzaprine (FLEXERIL) 10 MG Tab Take 1 Tab by mouth 3 times a day as needed. 30 Tab 0   • naproxen (NAPROSYN) 500 MG Tab Take 1 Tab by mouth 2 times a day, with meals. 60 Tab 1   • Diclofenac Sodium 1 % Gel Apply 2-4 grams to affected area 4 times daily as needed for pain. 1 Tube 0     No facility-administered encounter medications on file as of 3/12/2018.        SOCIAL HISTORY:    Social History     Social History   • Marital status:      Spouse name: N/A   • Number of children: N/A   • Years of education: N/A     Social History Main Topics   • Smoking status: Never Smoker   • Smokeless tobacco: Never Used   • Alcohol use Yes   • Drug use: No   • Sexual activity: Not on file     Other Topics Concern   •  Service Yes   • Blood Transfusions No   • Caffeine Concern No   • Occupational Exposure No   • Hobby Hazards No   • Sleep Concern Yes   • Stress Concern No   • Weight Concern No   • Special Diet No   • Back Care Yes   • Exercise Yes   • Bike Helmet Yes   • Seat Belt Yes   • Self-Exams No     Social History Narrative   • No narrative on file         Review of Systems   Constitutional: Negative for chills and fever.   HENT: Negative for ear discharge and ear pain.    Eyes: Negative for pain and discharge.   Respiratory: Negative for hemoptysis and sputum production.    Cardiovascular: Negative for orthopnea and claudication.   Gastrointestinal: Negative for constipation and diarrhea.   Genitourinary: Negative for frequency and hematuria.   Musculoskeletal: Positive for back  "pain, joint pain and myalgias.   Skin: Negative.    Endo/Heme/Allergies: Negative.    All other systems reviewed and are negative.          Objective:     /68   Pulse (!) 56   Temp 36.2 °C (97.2 °F)   Ht 1.803 m (5' 11\")   Wt 92.1 kg (203 lb)   SpO2 96%   BMI 28.31 kg/m²      Physical Exam  Constitutional: oriented to person, place, and time, appears well-developed and well-nourished.   HEENT: Normocephalic atraumatic, neck supple, no JVD noted, no masses noted, no meningeal signs noted  Lymphadenopathy: no cervical, supraclavicular, or inguinal lymphadenopathy noted  Cardiovascular: Intact distal pulses, including at ankles, no limb swelling noted  Pulmonary: No tachypnea noted, no accessory muscle use noted, no dyspnea noted  Abdominal: Soft, nontender, exhibits no distension, no peritoneal signs, no HSM  Musculoskeletal:   Hip: exhibits no significant tenderness. No significant pain with range of motion testing  Lumbar back: exhibits mild decreased range of motion, mild tenderness and mild pain. negative straight leg testing, only mild pain with quadrant loading and extension, mild tenderness sacroiliac joint region, trigger points noted lumbosacral region  Knee: Tender with palpation about left knee, including along joint line and medial/lateral collateral ligaments, only mild swelling noted, no signs of infection, stable with stress testing  Neurological: oriented to person, place, and time. Cranial nerves grossly intact, normal strength. Sensation intact distally. Reflexes 1+ in lower limbs, Gait normal. Able to heel/toe walk, No upper motor neuron signs evident  Skin: Skin is intact. no rashes or lesions noted  Psychiatric: normal mood and affect. speech is normal and behavior is normal. Judgment and thought content normal. Cognition and memory are normal.         Procedure note: Written/informed consent was obtained, risks benefits and alternatives discussed, and all questions were answered. The " patient was placed prone in the exam table and 2 trigger points each were identified in the bilateral lumbosacral paraspinal muscles. The areas were marked, then sterilely prepared. Then using a 25-gauge needle, trigger point injections were performed with injection of 2 cc of a mixture of 1 cc of Depo-Medrol 40 mg/cc, 3.5 cc of 1% lidocaine, and 3.5 cc of 0.9% preservative-free normal saline at each site. The patient tolerated the procedure well. There were no complications.      Assessment/Plan:       ASSESSMENT:    1. Lumbosacral pain, myofascial pain, disc bulge with annular tear at L5-S1, foraminal stenosis, degenerative disc disease, spondylosis    - I reviewed post procedure precautions  - Reviewed injection therapy with further trigger point injections to treat the myofascial component to the ongoing pain, submit authorization request    2. Left knee pain, sprain strain, patellar cartilage degeneration, effusion    - Request authorization for left knee intra-articular joint injection      DISCUSSION/PLAN:    - I discussed management options. I reviewed symptomatic care    - Wrote letter 2/2018 recommending patient continue on profile/limited duty with the [facility] wall evaluation treatment and progress.    - I reviewed home exercise program. I reviewed activity modification    - The patient can consider complementary trials with acupuncture, massage therapy, or TENS unit    - I reviewed medication monitoring. For now, continue current medications. I reviewed further symptomatic medications.    - I reviewed additional diagnostic options, including further/advanced imaging, electrodiagnostic testing, and further lab screen    - I reviewed additional therapeutic options, including further injection/interventional therapy and additional consultative input     - Return, when authorized, for the above noted injections, or an as-needed basis      Please note that this dictation was created using voice recognition  software. I have made every reasonable attempt to correct obvious errors but there may be errors of grammar and content that I may have overlooked prior to finalization of this note.

## 2018-03-15 ENCOUNTER — TELEPHONE (OUTPATIENT)
Dept: PHYSICAL MEDICINE AND REHAB | Facility: MEDICAL CENTER | Age: 33
End: 2018-03-15

## 2018-05-22 ENCOUNTER — OFFICE VISIT (OUTPATIENT)
Dept: INTERNAL MEDICINE | Facility: MEDICAL CENTER | Age: 33
End: 2018-05-22
Payer: OTHER GOVERNMENT

## 2018-05-22 VITALS
HEART RATE: 74 BPM | WEIGHT: 214.8 LBS | SYSTOLIC BLOOD PRESSURE: 120 MMHG | TEMPERATURE: 98 F | BODY MASS INDEX: 30.07 KG/M2 | OXYGEN SATURATION: 95 % | HEIGHT: 71 IN | DIASTOLIC BLOOD PRESSURE: 78 MMHG

## 2018-05-22 DIAGNOSIS — R19.7 DIARRHEA, UNSPECIFIED TYPE: ICD-10-CM

## 2018-05-22 DIAGNOSIS — Z00.00 ENCOUNTER FOR ROUTINE HISTORY AND PHYSICAL EXAMINATION: ICD-10-CM

## 2018-05-22 PROCEDURE — 99203 OFFICE O/P NEW LOW 30 MIN: CPT | Mod: GE | Performed by: INTERNAL MEDICINE

## 2018-05-22 ASSESSMENT — PATIENT HEALTH QUESTIONNAIRE - PHQ9: CLINICAL INTERPRETATION OF PHQ2 SCORE: 0

## 2018-05-22 ASSESSMENT — PAIN SCALES - GENERAL: PAINLEVEL: 6=MODERATE PAIN

## 2018-05-22 NOTE — PROGRESS NOTES
New Patient to Establish    Reason to establish: New patient to establish    CC: Diarrhea     HPI:     Roshan Bailey is a 32 year old male who presents with the following:    Diarrhea -non bloody, preceded by diffuse abdominal cramping, relieved with bowel movement, urgency associated prior, no rectal discomfort/pain, no fevers/chills or recent travel. No recent antibiotic use or epigastric pain with radiation to back. No alcohol use. Not associated with foods. Patient does consume dairy products but no specific triggers.     Right pinky sprains -pain, but no difficult with range of motion or atrophy.     Healthcare Maintenance  Tetanus -to follow up on records.   HIV one time screen -to be completed.       Patient Active Problem List    Diagnosis Date Noted   • Diarrhea 05/22/2018       History reviewed. No pertinent past medical history.    No current outpatient prescriptions on file.     No current facility-administered medications for this visit.        Allergies as of 05/22/2018   • (No Known Allergies)       Social History     Social History   • Marital status:      Spouse name: N/A   • Number of children: N/A   • Years of education: N/A     Occupational History   • Not on file.     Social History Main Topics   • Smoking status: Never Smoker   • Smokeless tobacco: Never Used   • Alcohol use 0.6 oz/week     1 Cans of beer per week   • Drug use: No   • Sexual activity: Not on file     Other Topics Concern   •  Service Yes   • Blood Transfusions No   • Caffeine Concern No   • Occupational Exposure No   • Hobby Hazards No   • Sleep Concern Yes   • Stress Concern No   • Weight Concern No   • Special Diet No   • Back Care Yes   • Exercise Yes   • Bike Helmet Yes   • Seat Belt Yes   • Self-Exams No     Social History Narrative   • No narrative on file       Family History   Problem Relation Age of Onset   • Thyroid Mother    • Diabetes Father        History reviewed. No pertinent surgical  "history.    ROS: A complete 14-system review of systems was obtained and is otherwise negative except as stated in the history of present illness, below, and/or the pre-visit questionnaire.     /78   Pulse 74   Temp 36.7 °C (98 °F)   Ht 1.797 m (5' 10.75\")   Wt 97.4 kg (214 lb 12.8 oz)   SpO2 95%   BMI 30.17 kg/m²     Physical Exam  General:  Alert and oriented, No apparent distress.    Eyes: Pupils equal and reactive. No scleral icterus.    Throat: Clear no erythema or exudates noted.    Neck: Supple. No lymphadenopathy noted. Thyroid not enlarged.    Lungs: Clear to auscultation and percussion bilaterally.    Cardiovascular: Regular rate and rhythm. No murmurs, rubs or gallops.    Abdomen:  Benign. No rebound or guarding noted. Mild LLQ pain to deep palpation. No hernias.     Extremities: No clubbing, cyanosis, edema.    Skin: Clear. No rash or suspicious skin lesions noted.    Psych: No suicidal or homicidal ideations.     Note: I have reviewed all pertinent labs and diagnostic tests associated with this visit with specific comments listed under the assessment and plan below    Assessment and Plan    1. Encounter for routine history and physical examination  HIV one time screen  Tetanus vaccine to complete at next visit as needed       2. Diarrhea, unspecified type  Likely IBS v microscopic colitis   Colonoscopy as needed   Probiotics are not helping  Will try avoiding fructose/lactose and other dairy products  - CELIAC DISEASE AB PANEL  - STOOL WBC'S  - COMPLETE O&P  - CELIAC DISEASE AB PANEL  - STOOL WBC'S  - COMPLETE O&P      Followup: Return in about 1 year (around 5/22/2019) for Short.      Signed by: Celia Cross M.D.    "

## 2018-05-23 ENCOUNTER — OFFICE VISIT (OUTPATIENT)
Dept: PHYSICAL MEDICINE AND REHAB | Facility: MEDICAL CENTER | Age: 33
End: 2018-05-23
Payer: OTHER GOVERNMENT

## 2018-05-23 VITALS
BODY MASS INDEX: 28.56 KG/M2 | DIASTOLIC BLOOD PRESSURE: 78 MMHG | HEIGHT: 71 IN | SYSTOLIC BLOOD PRESSURE: 112 MMHG | HEART RATE: 69 BPM | TEMPERATURE: 97.3 F | WEIGHT: 204 LBS | OXYGEN SATURATION: 97 %

## 2018-05-23 DIAGNOSIS — M47.816 LUMBAR SPONDYLOSIS: ICD-10-CM

## 2018-05-23 DIAGNOSIS — M54.50 LUMBOSACRAL PAIN: ICD-10-CM

## 2018-05-23 DIAGNOSIS — M25.562 LEFT KNEE PAIN, UNSPECIFIED CHRONICITY: ICD-10-CM

## 2018-05-23 DIAGNOSIS — M79.18 MYOFASCIAL PAIN: ICD-10-CM

## 2018-05-23 DIAGNOSIS — M25.559 ARTHRALGIA OF HIP, UNSPECIFIED LATERALITY: ICD-10-CM

## 2018-05-23 PROCEDURE — 99213 OFFICE O/P EST LOW 20 MIN: CPT | Performed by: PHYSICAL MEDICINE & REHABILITATION

## 2018-05-23 RX ORDER — ACETAMINOPHEN 500 MG
500-1000 TABLET ORAL EVERY 6 HOURS PRN
COMMUNITY

## 2018-05-23 NOTE — PROGRESS NOTES
Special Procedures H&P:    Subjective: Mr. Bailey notes ongoing moderately severe pain in the  lumbosacral region, without overt radicular component. The patient notes intermittent lower limb radiation. The axial lumbosacral pain is most functional limiting.  The pain limits his ability to function, including standing and running tolerance. He has had the pain for greater than 3 months. The patient has had prior treatment with medications, including NSAIDs. He has been to physical therapy. No acute changes with bowel/bladder noted. No acute changes with strength noted. He is making an effort with home exercise program. The ongoing lumbosacral pain limits his ability to function. The patient is inquiring about further treatment options.     MEDICAL RECORDS REVIEW/DATA REVIEW: Reviewed in epic.     I reviewed medications. Tried NSAIDs.      I reviewed diagnostic studies:      I reviewed radiographs.      Reviewed MRI lumbar spine 12/2017. Reviewed lumbar spine x-ray 10/2017, unremarkable.      I reviewed lab studies.      I reviewed medical issues. Followed by primary care provider, intermittent IBS symptoms. History of left knee pain, controlled.     I reviewed family history: No neuromuscular disorders noted.     I reviewed social issues.       PAST MEDICAL HISTORY:   Past Medical History   History reviewed. No pertinent past medical history.        PAST SURGICAL HISTORY:    Past Surgical History   History reviewed. No pertinent surgical history.        ALLERGIES:  Patient has no known allergies.     MEDICATIONS:    Encounter Medications          Outpatient Encounter Prescriptions as of 5/23/2018   Medication Sig Dispense Refill   • acetaminophen (TYLENOL) 500 MG Tab Take 500-1,000 mg by mouth every 6 hours as needed.          No facility-administered encounter medications on file as of 5/23/2018.             SOCIAL HISTORY:    Social History               Social History   • Marital status:        Spouse  "name: N/A   • Number of children: N/A   • Years of education: N/A      Social History Main Topics   • Smoking status: Never Smoker   • Smokeless tobacco: Never Used   • Alcohol use 0.6 oz/week       1 Cans of beer per week   • Drug use: No   • Sexual activity: Not on file           Other Topics Concern   •  Service Yes   • Blood Transfusions No   • Caffeine Concern No   • Occupational Exposure No   • Hobby Hazards No   • Sleep Concern Yes   • Stress Concern No   • Weight Concern No   • Special Diet No   • Back Care Yes   • Exercise Yes   • Bike Helmet Yes   • Seat Belt Yes   • Self-Exams No      Social History Narrative   • No narrative on file             Review of Systems  reviewed, no changes noted  Constitutional: Negative for chills and fever.   HENT: Negative for ear discharge and ear pain.    Eyes: Negative for pain and discharge.   Respiratory: Negative for hemoptysis and sputum production.    Cardiovascular: Negative for orthopnea and claudication.   Gastrointestinal: Negative for constipation and diarrhea.   Genitourinary: Negative for frequency and hematuria.   Musculoskeletal: Positive for back pain, joint pain and myalgias.   Skin: Negative.    Endo/Heme/Allergies: Negative.    All other systems reviewed and are negative.         Objective:      /78   Pulse 69   Temp 36.3 °C (97.3 °F)   Ht 1.803 m (5' 11\")   Wt 92.5 kg (204 lb)   SpO2 97%   BMI 28.45 kg/m²       Physical Exam  Constitutional: oriented to person, place, and time, appears well-developed and well-nourished.   HEENT: Normocephalic atraumatic, neck supple, no JVD noted, no masses noted, no meningeal signs noted  Lymphadenopathy: no cervical, supraclavicular, or inguinal lymphadenopathy noted  Cardiovascular: Intact distal pulses, including at ankles, no limb swelling noted  Pulmonary: No tachypnea noted, no accessory muscle use noted, no dyspnea noted  Abdominal: Soft, nontender, exhibits no distension, no peritoneal " signs, no HSM  Musculoskeletal:   Hip: exhibits no significant tenderness. No significant pain with range of motion testing  Lumbar back: exhibits decreased range of motion,  tenderness and pain. negative straight leg testing, pain with quadrant loading and extension bilaterally, reproducing pain, mild tenderness sacroiliac joint region, trigger points noted lumbosacral region  Knee: Mild tender with palpation about left knee, including along joint line and medial/lateral collateral ligaments, only mild swelling noted, no signs of infection, stable with stress testing  Neurological: oriented to person, place, and time. Cranial nerves grossly intact, normal strength. Sensation intact distally. Reflexes 1+ in lower limbs, Gait  steady, reciprocal, No upper motor neuron signs evident  Skin: Skin is intact. no rashes or lesions noted  Psychiatric: normal mood and affect. speech is normal and behavior is normal. Judgment and thought content normal. Cognition and memory are normal.       Assessment:      Lumbar spondylosis, suspect lumbar facet syndrome      Plan:      Diagnostic bilateral lumbar 3, 4, and 5 medial branch blocks, facet blocks, #1

## 2018-05-23 NOTE — PROGRESS NOTES
Subjective:      Roshan Bailey presents with Follow-Up        Subjective: Mr. Bailey returns to the office today for follow-up evaluation of spinal/joints/musculoskeletal pain.     The patient notes only transient benefit with the lumbosacral trigger point injections performed the last visit, then pain returned to the preinjection level, denies complications.    Unfortunately, the patient notes ongoing moderately severe pain in the  lumbosacral region, without overt radicular component. The patient notes intermittent lower limb radiation. The axial lumbosacral pain is most functional limiting.  The pain limits his ability to function, including standing and running tolerance. He has had the pain for greater than 3 months.     The patient notes some improvement with his left knee pain since decreasing his activities, primarily decreasing running activities, as recommended. He notes left knee pain is only intermittent. The patient notes intermittent partial locking symptoms, notes occasional swelling.     The patient has had prior treatment with medications, including NSAIDs. He has been to physical therapy. No acute changes with bowel/bladder noted. No acute changes with strength noted. He is making an effort with home exercise program. The ongoing lumbosacral pain limits his ability to function. The patient is inquiring about further treatment options.      MEDICAL RECORDS REVIEW/DATA REVIEW: Reviewed in epic.    I reviewed medications. Tried NSAIDs. Desires to limit/avoid oral medications.    I reviewed diagnostic studies:     I reviewed radiographs.     Reviewed MRI lumbar spine 12/2017. Reviewed lumbar spine x-ray 10/2017, unremarkable.     Reviewed MRI of left knee 3/2018 revealed patellar cartilage fissuring with minimal underlying bone marrow edema, small effusion. Reviewed left knee x-rays 6/2017, unremarkable.    I reviewed lab studies.     I reviewed medical issues.     I reviewed family history:  No neuromuscular disorders noted.    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      PAST MEDICAL HISTORY: History reviewed. No pertinent past medical history.    PAST SURGICAL HISTORY:  History reviewed. No pertinent surgical history.    ALLERGIES:  Patient has no known allergies.    MEDICATIONS:    Outpatient Encounter Prescriptions as of 5/23/2018   Medication Sig Dispense Refill   • acetaminophen (TYLENOL) 500 MG Tab Take 500-1,000 mg by mouth every 6 hours as needed.       No facility-administered encounter medications on file as of 5/23/2018.        SOCIAL HISTORY:    Social History     Social History   • Marital status:      Spouse name: N/A   • Number of children: N/A   • Years of education: N/A     Social History Main Topics   • Smoking status: Never Smoker   • Smokeless tobacco: Never Used   • Alcohol use 0.6 oz/week     1 Cans of beer per week   • Drug use: No   • Sexual activity: Not on file     Other Topics Concern   •  Service Yes   • Blood Transfusions No   • Caffeine Concern No   • Occupational Exposure No   • Hobby Hazards No   • Sleep Concern Yes   • Stress Concern No   • Weight Concern No   • Special Diet No   • Back Care Yes   • Exercise Yes   • Bike Helmet Yes   • Seat Belt Yes   • Self-Exams No     Social History Narrative   • No narrative on file         Review of Systems  reviewed, no changes noted  Constitutional: Negative for chills and fever.   HENT: Negative for ear discharge and ear pain.    Eyes: Negative for pain and discharge.   Respiratory: Negative for hemoptysis and sputum production.    Cardiovascular: Negative for orthopnea and claudication.   Gastrointestinal: Negative for constipation and diarrhea.   Genitourinary: Negative for frequency and hematuria.   Musculoskeletal: Positive for  "back pain, joint pain and myalgias.   Skin: Negative.    Endo/Heme/Allergies: Negative.    All other systems reviewed and are negative.          Objective:     /78   Pulse 69   Temp 36.3 °C (97.3 °F)   Ht 1.803 m (5' 11\")   Wt 92.5 kg (204 lb)   SpO2 97%   BMI 28.45 kg/m²      Physical Exam  Constitutional: oriented to person, place, and time, appears well-developed and well-nourished.   HEENT: Normocephalic atraumatic, neck supple, no JVD noted, no masses noted, no meningeal signs noted  Lymphadenopathy: no cervical, supraclavicular, or inguinal lymphadenopathy noted  Cardiovascular: Intact distal pulses, including at ankles, no limb swelling noted  Pulmonary: No tachypnea noted, no accessory muscle use noted, no dyspnea noted  Abdominal: Soft, nontender, exhibits no distension, no peritoneal signs, no HSM  Musculoskeletal:   Hip: exhibits no significant tenderness. No significant pain with range of motion testing  Lumbar back: exhibits decreased range of motion,  tenderness and pain. negative straight leg testing, pain with quadrant loading and extension bilaterally, reproducing pain, mild tenderness sacroiliac joint region, trigger points noted lumbosacral region  Knee: Mild tender with palpation about left knee, including along joint line and medial/lateral collateral ligaments, only mild swelling noted, no signs of infection, stable with stress testing  Neurological: oriented to person, place, and time. Cranial nerves grossly intact, normal strength. Sensation intact distally. Reflexes 1+ in lower limbs, Gait normal. Able to heel/toe walk, No upper motor neuron signs evident  Skin: Skin is intact. no rashes or lesions noted  Psychiatric: normal mood and affect. speech is normal and behavior is normal. Judgment and thought content normal. Cognition and memory are normal.           Assessment/Plan:       ASSESSMENT:    1. Lumbosacral pain, myofascial pain, disc bulge, annular tear, foraminal stenosis, " degenerative disc disease, spondylosis, suspect lumbar facet syndrome    - Reviewed injection therapy with diagnostic bilateral lumbar 3, 4, and 5 medial branch blocks, facet blocks, #1    2. Left knee pain, sprain strain, patellar cartilage degeneration, effusion, relatively symptomatically controlled    - Reviewed injection therapy with left knee intra-articular joint injection, if not responding to more conservative care      DISCUSSION/PLAN:    - I discussed management options. I reviewed symptomatic care    - Wrote letter 5/2018 recommending patient continue on profile/limited duty with [employer] for 3 months, then reevaluation    - I reviewed home exercise program and activity modification    - The patient can consider complementary trials with acupuncture, massage therapy, or TENS unit    - I reviewed medication monitoring. For now, continue current medications. I reviewed further symptomatic medications. I did not prescribe any medications today.    - I reviewed additional diagnostic options, including further/advanced imaging, electrodiagnostic testing, and further lab screen    - I reviewed additional therapeutic options, including further injection/interventional therapy and additional consultative input     - I will plan on seeing the patient back in the procedure center for the above noted intervention or in the office were we can further review management options      Please note that this dictation was created using voice recognition software. I have made every reasonable attempt to correct obvious errors but there may be errors of grammar and content that I may have overlooked prior to finalization of this note.

## 2018-05-23 NOTE — LETTER
May 23, 2018        Roshan Bailey  [address] [place] [address]        To Whom This May Concern,     Roshan was seen today regarding low back and left knee pain. I recommend continue on profile, including no running or sit ups.     He is scheduled for spine injections.     We can re-evaluate in 6 months.     If you have any questions or concerns, please don't hesitate to call.        Sincerely,        Hermes Dawson M.D.    Electronically Signed

## 2018-05-31 ENCOUNTER — HOSPITAL ENCOUNTER (OUTPATIENT)
Dept: LAB | Facility: MEDICAL CENTER | Age: 33
End: 2018-05-31
Attending: STUDENT IN AN ORGANIZED HEALTH CARE EDUCATION/TRAINING PROGRAM
Payer: OTHER GOVERNMENT

## 2018-05-31 DIAGNOSIS — Z00.00 ENCOUNTER FOR ROUTINE HISTORY AND PHYSICAL EXAMINATION: ICD-10-CM

## 2018-05-31 DIAGNOSIS — R19.7 DIARRHEA, UNSPECIFIED TYPE: ICD-10-CM

## 2018-05-31 LAB
25(OH)D3 SERPL-MCNC: 17 NG/ML (ref 30–100)
ALBUMIN SERPL BCP-MCNC: 4.5 G/DL (ref 3.2–4.9)
ALBUMIN/GLOB SERPL: 1.5 G/DL
ALP SERPL-CCNC: 55 U/L (ref 30–99)
ALT SERPL-CCNC: 43 U/L (ref 2–50)
ANION GAP SERPL CALC-SCNC: 8 MMOL/L (ref 0–11.9)
AST SERPL-CCNC: 32 U/L (ref 12–45)
BASOPHILS # BLD AUTO: 0.7 % (ref 0–1.8)
BASOPHILS # BLD: 0.04 K/UL (ref 0–0.12)
BILIRUB SERPL-MCNC: 0.5 MG/DL (ref 0.1–1.5)
BUN SERPL-MCNC: 18 MG/DL (ref 8–22)
CALCIUM SERPL-MCNC: 9.6 MG/DL (ref 8.5–10.5)
CHLORIDE SERPL-SCNC: 103 MMOL/L (ref 96–112)
CHOLEST SERPL-MCNC: 204 MG/DL (ref 100–199)
CO2 SERPL-SCNC: 28 MMOL/L (ref 20–33)
CREAT SERPL-MCNC: 1.06 MG/DL (ref 0.5–1.4)
EOSINOPHIL # BLD AUTO: 0.11 K/UL (ref 0–0.51)
EOSINOPHIL NFR BLD: 1.9 % (ref 0–6.9)
ERYTHROCYTE [DISTWIDTH] IN BLOOD BY AUTOMATED COUNT: 40.3 FL (ref 35.9–50)
EST. AVERAGE GLUCOSE BLD GHB EST-MCNC: 114 MG/DL
GLOBULIN SER CALC-MCNC: 3 G/DL (ref 1.9–3.5)
GLUCOSE SERPL-MCNC: 78 MG/DL (ref 65–99)
HBA1C MFR BLD: 5.6 % (ref 0–5.6)
HCT VFR BLD AUTO: 45.3 % (ref 42–52)
HDLC SERPL-MCNC: 51 MG/DL
HGB BLD-MCNC: 15.3 G/DL (ref 14–18)
HIV 1+2 AB+HIV1 P24 AG SERPL QL IA: 16.52
HIV 1+2 AB+HIV1 P24 AG SERPL QL IA: NON REACTIVE
IMM GRANULOCYTES # BLD AUTO: 0.02 K/UL (ref 0–0.11)
IMM GRANULOCYTES NFR BLD AUTO: 0.3 % (ref 0–0.9)
LDLC SERPL CALC-MCNC: 140 MG/DL
LIPASE SERPL-CCNC: 19 U/L (ref 11–82)
LYMPHOCYTES # BLD AUTO: 1.57 K/UL (ref 1–4.8)
LYMPHOCYTES NFR BLD: 26.7 % (ref 22–41)
MCH RBC QN AUTO: 29.8 PG (ref 27–33)
MCHC RBC AUTO-ENTMCNC: 33.8 G/DL (ref 33.7–35.3)
MCV RBC AUTO: 88.1 FL (ref 81.4–97.8)
MONOCYTES # BLD AUTO: 0.4 K/UL (ref 0–0.85)
MONOCYTES NFR BLD AUTO: 6.8 % (ref 0–13.4)
NEUTROPHILS # BLD AUTO: 3.73 K/UL (ref 1.82–7.42)
NEUTROPHILS NFR BLD: 63.6 % (ref 44–72)
NRBC # BLD AUTO: 0 K/UL
NRBC BLD-RTO: 0 /100 WBC
PLATELET # BLD AUTO: 222 K/UL (ref 164–446)
PMV BLD AUTO: 12.1 FL (ref 9–12.9)
POTASSIUM SERPL-SCNC: 4.3 MMOL/L (ref 3.6–5.5)
PROT SERPL-MCNC: 7.5 G/DL (ref 6–8.2)
RBC # BLD AUTO: 5.14 M/UL (ref 4.7–6.1)
SODIUM SERPL-SCNC: 139 MMOL/L (ref 135–145)
T4 FREE SERPL-MCNC: 0.64 NG/DL (ref 0.53–1.43)
TRIGL SERPL-MCNC: 66 MG/DL (ref 0–149)
TSH SERPL DL<=0.005 MIU/L-ACNC: 0.64 UIU/ML (ref 0.38–5.33)
WBC # BLD AUTO: 5.9 K/UL (ref 4.8–10.8)

## 2018-05-31 PROCEDURE — 83516 IMMUNOASSAY NONANTIBODY: CPT

## 2018-05-31 PROCEDURE — 83690 ASSAY OF LIPASE: CPT

## 2018-05-31 PROCEDURE — 84443 ASSAY THYROID STIM HORMONE: CPT

## 2018-05-31 PROCEDURE — 80061 LIPID PANEL: CPT

## 2018-05-31 PROCEDURE — 82784 ASSAY IGA/IGD/IGG/IGM EACH: CPT

## 2018-05-31 PROCEDURE — 84439 ASSAY OF FREE THYROXINE: CPT

## 2018-05-31 PROCEDURE — 82306 VITAMIN D 25 HYDROXY: CPT

## 2018-05-31 PROCEDURE — 83036 HEMOGLOBIN GLYCOSYLATED A1C: CPT

## 2018-05-31 PROCEDURE — 87389 HIV-1 AG W/HIV-1&-2 AB AG IA: CPT | Mod: 91

## 2018-05-31 PROCEDURE — 36415 COLL VENOUS BLD VENIPUNCTURE: CPT

## 2018-05-31 PROCEDURE — 80053 COMPREHEN METABOLIC PANEL: CPT

## 2018-05-31 PROCEDURE — 85025 COMPLETE CBC W/AUTO DIFF WBC: CPT

## 2018-06-01 ENCOUNTER — TELEPHONE (OUTPATIENT)
Dept: HOSPITALIST | Facility: MEDICAL CENTER | Age: 33
End: 2018-06-01

## 2018-06-01 DIAGNOSIS — R19.7 DIARRHEA, UNSPECIFIED TYPE: ICD-10-CM

## 2018-06-01 DIAGNOSIS — E55.9 VITAMIN D DEFICIENCY: ICD-10-CM

## 2018-06-01 RX ORDER — ERGOCALCIFEROL 1.25 MG/1
50000 CAPSULE ORAL
Qty: 12 CAP | Refills: 0 | Status: SHIPPED | OUTPATIENT
Start: 2018-06-01 | End: 2018-06-05

## 2018-06-01 NOTE — TELEPHONE ENCOUNTER
Have called lab to add celiac panel comprehensive. Stool sample was not provided by patient, have sent him message to see if we can get that. If all normal, will proceed with GI consult for colonoscopy.

## 2018-06-03 LAB — IGA SERPL-MCNC: 126 MG/DL (ref 68–408)

## 2018-06-04 LAB — TTG IGA SER IA-ACNC: 0 U/ML (ref 0–3)

## 2018-06-05 ENCOUNTER — HOSPITAL ENCOUNTER (OUTPATIENT)
Dept: RADIOLOGY | Facility: REHABILITATION | Age: 33
End: 2018-06-05
Attending: PHYSICAL MEDICINE & REHABILITATION
Payer: OTHER GOVERNMENT

## 2018-06-05 ENCOUNTER — HOSPITAL ENCOUNTER (OUTPATIENT)
Dept: PAIN MANAGEMENT | Facility: REHABILITATION | Age: 33
End: 2018-06-05
Attending: PHYSICAL MEDICINE & REHABILITATION
Payer: OTHER GOVERNMENT

## 2018-06-05 VITALS
HEART RATE: 63 BPM | HEIGHT: 71 IN | WEIGHT: 214.29 LBS | DIASTOLIC BLOOD PRESSURE: 75 MMHG | RESPIRATION RATE: 16 BRPM | OXYGEN SATURATION: 94 % | BODY MASS INDEX: 30 KG/M2 | SYSTOLIC BLOOD PRESSURE: 138 MMHG

## 2018-06-05 PROCEDURE — 64494 INJ PARAVERT F JNT L/S 2 LEV: CPT

## 2018-06-05 PROCEDURE — 64495 INJ PARAVERT F JNT L/S 3 LEV: CPT

## 2018-06-05 PROCEDURE — 700111 HCHG RX REV CODE 636 W/ 250 OVERRIDE (IP)

## 2018-06-05 PROCEDURE — 700117 HCHG RX CONTRAST REV CODE 255

## 2018-06-05 PROCEDURE — 64493 INJ PARAVERT F JNT L/S 1 LEV: CPT

## 2018-06-05 RX ORDER — LIDOCAINE HYDROCHLORIDE 10 MG/ML
INJECTION, SOLUTION EPIDURAL; INFILTRATION; INTRACAUDAL; PERINEURAL
Status: COMPLETED
Start: 2018-06-05 | End: 2018-06-05

## 2018-06-05 RX ORDER — METHYLPREDNISOLONE ACETATE 80 MG/ML
INJECTION, SUSPENSION INTRA-ARTICULAR; INTRALESIONAL; INTRAMUSCULAR; SOFT TISSUE
Status: COMPLETED
Start: 2018-06-05 | End: 2018-06-05

## 2018-06-05 RX ADMIN — IOHEXOL 2 ML: 240 INJECTION, SOLUTION INTRATHECAL; INTRAVASCULAR; INTRAVENOUS; ORAL at 14:26

## 2018-06-05 RX ADMIN — LIDOCAINE HYDROCHLORIDE 5 ML: 10 INJECTION, SOLUTION EPIDURAL; INFILTRATION; INTRACAUDAL; PERINEURAL at 14:28

## 2018-06-05 RX ADMIN — METHYLPREDNISOLONE ACETATE 80 MG: 80 INJECTION, SUSPENSION INTRA-ARTICULAR; INTRALESIONAL; INTRAMUSCULAR; SOFT TISSUE at 14:27

## 2018-06-05 RX ADMIN — LIDOCAINE HYDROCHLORIDE 10 ML: 10 INJECTION, SOLUTION EPIDURAL; INFILTRATION; INTRACAUDAL; PERINEURAL at 14:23

## 2018-06-05 ASSESSMENT — PAIN SCALES - GENERAL
PAINLEVEL_OUTOF10: 6
PAINLEVEL_OUTOF10: 3

## 2018-06-05 NOTE — PROGRESS NOTES
Pt given verbal and written d/c instructions and verbalizes understanding. denies nsaid use in last 3 days, denies blood thinners use in last 5 days, denies current infection or abx use. Med rec complete. Pt has post procedural ride home with [facility]

## 2018-06-05 NOTE — PROGRESS NOTES
Pt positioned prone by RN and CST. Arms hanging off the head of the table, hands resting on stool under table . Pillow placed under feet and lower legs by CST.

## 2018-06-06 ENCOUNTER — TELEPHONE (OUTPATIENT)
Dept: PHYSICAL MEDICINE AND REHAB | Facility: MEDICAL CENTER | Age: 33
End: 2018-06-06

## 2018-06-06 ENCOUNTER — HOSPITAL ENCOUNTER (OUTPATIENT)
Facility: MEDICAL CENTER | Age: 33
End: 2018-06-06
Attending: STUDENT IN AN ORGANIZED HEALTH CARE EDUCATION/TRAINING PROGRAM
Payer: OTHER GOVERNMENT

## 2018-06-06 DIAGNOSIS — R19.7 DIARRHEA, UNSPECIFIED TYPE: ICD-10-CM

## 2018-06-06 LAB — H PYLORI AG STL QL IA: NOT DETECTED

## 2018-06-06 PROCEDURE — 87338 HPYLORI STOOL AG IA: CPT

## 2018-06-06 PROCEDURE — 87045 FECES CULTURE AEROBIC BACT: CPT

## 2018-06-06 PROCEDURE — 87899 AGENT NOS ASSAY W/OPTIC: CPT

## 2018-06-06 PROCEDURE — 87046 STOOL CULTR AEROBIC BACT EA: CPT

## 2018-06-06 NOTE — TELEPHONE ENCOUNTER
I spoke with Roshan and he said he is sore from procedure. I let him know it may take a little while to get full benefits.   He will keep fv appt.

## 2018-06-06 NOTE — PROCEDURES
DATE OF SERVICE:  06/05/2018    DIAGNOSES:  Lumbar spondylosis, suspect lumbar facet syndrome.    PROCEDURE:  Diagnostic bilateral lumbar 3, 4, and 5 medial branch blocks,   facet blocks, #1.    PROCEDURE NOTE:  Informed consent was obtained.  Risks, benefits, and   alternatives were discussed and all questions answered.  Patient was placed   prone on the fluoroscopy table.  The skin area was prepared in a sterile   manner with povidone-iodine.  The patient was monitored throughout the   procedure.    Fluoroscopy was turned on and positioned to identify the sacral ala and the   transverse process on the left side of the L4 and L5 vertebra.  Those areas   were then locally anesthetized with 2 mL of 1% lidocaine.    Using fluoroscopy, 22-gauge spinal needles were placed at the superior border   of the transverse process and vertebral body L4, L5 and sacral ala, confirmed   on multiplanar imaging.  0.5-1 mL of Iohexol dye was injected to verify   location and ensure nonvascular flow.  Then, following negative aspiration, 1   mL of mixture of 1 mL of methylprednisolone 80 mg/mL and 5 mL of 1%   preservative-free lidocaine was injected at each area.  The same procedure was  repeated on the contralateral side.  A total of 3 spinal needles were used   for the medial branch blocks.  There were no complications.    The patient reported preprocedure pain of 6/10 and postprocedure pain of 3/10.    Post-injection instructions were reviewed with the patient.  Then, the   patient was discharged home in the care of a friend and/or family.       ____________________________________     MD J LUIS HINOJOSA / ELLA    DD:  06/05/2018 15:45:34  DT:  06/06/2018 05:37:24    D#:  [id number]  Job#:  [id number]

## 2018-06-07 LAB
E COLI SXT1+2 STL IA: NORMAL
SIGNIFICANT IND 70042: NORMAL
SITE SITE: NORMAL
SOURCE SOURCE: NORMAL

## 2018-06-09 LAB
BACTERIA STL CULT: NORMAL
E COLI SXT1+2 STL IA: NORMAL
SIGNIFICANT IND 70042: NORMAL
SITE SITE: NORMAL
SOURCE SOURCE: NORMAL

## 2018-06-20 ENCOUNTER — OFFICE VISIT (OUTPATIENT)
Dept: PHYSICAL MEDICINE AND REHAB | Facility: MEDICAL CENTER | Age: 33
End: 2018-06-20
Payer: OTHER GOVERNMENT

## 2018-06-20 VITALS
SYSTOLIC BLOOD PRESSURE: 116 MMHG | DIASTOLIC BLOOD PRESSURE: 68 MMHG | TEMPERATURE: 97.2 F | WEIGHT: 216 LBS | BODY MASS INDEX: 30.24 KG/M2 | HEART RATE: 66 BPM | HEIGHT: 71 IN | OXYGEN SATURATION: 95 %

## 2018-06-20 DIAGNOSIS — M47.816 LUMBAR SPONDYLOSIS: ICD-10-CM

## 2018-06-20 DIAGNOSIS — M79.18 MYOFASCIAL PAIN: ICD-10-CM

## 2018-06-20 DIAGNOSIS — M51.36 DDD (DEGENERATIVE DISC DISEASE), LUMBAR: ICD-10-CM

## 2018-06-20 DIAGNOSIS — M54.50 LUMBOSACRAL PAIN: ICD-10-CM

## 2018-06-20 DIAGNOSIS — M25.552 LEFT HIP PAIN: ICD-10-CM

## 2018-06-20 PROCEDURE — 99213 OFFICE O/P EST LOW 20 MIN: CPT | Performed by: PHYSICAL MEDICINE & REHABILITATION

## 2018-06-20 NOTE — PROGRESS NOTES
Special Procedures H&P:    Subjective: Mr. Bailey notes ongoing moderately severe pain in the left greater than right lumbosacral region, constant, without overt radicular component. The pain limits his ability to function, including sitting, standing, and walking tolerance, also limits advance activities. He has had the pain for greater than 4 months. The patient underwent diagnostic bilateral lumbar 3, 4, and 5 medial branch blocks, facet blocks, #1, on 6/5/2018. The patient notes approximately 75% pain relief from the local anesthetic phase, then soreness, then transient steroid response with short-term pain relief, then recurrence, with a slight change with the pain pattern. The patient has had prior treatment with medications, including NSAIDs. He has been to physical therapy. No acute changes with bowel/bladder noted. No acute changes with strength noted. He is making an effort with home exercise program. The ongoing pain limits his ability to function. The patient is inquiring about further treatment options.     MEDICAL RECORDS REVIEW/DATA REVIEW: Reviewed in epic.     I reviewed medications. Tried NSAIDs. .     I reviewed diagnostic studies:      I reviewed radiographs.     Reviewed MRI lumbar spine 12/2017. Reviewed lumbar spine x-ray 10/2017.       I reviewed lab studies.      I reviewed medical issues.      I reviewed family history: No neuromuscular disorders noted.     I reviewed social issues. [employer], [employer],       PAST MEDICAL HISTORY:   Past Medical History   History reviewed. No pertinent past medical history.        PAST SURGICAL HISTORY:    Past Surgical History   History reviewed. No pertinent surgical history.        ALLERGIES:  Patient has no known allergies.     MEDICATIONS:    Encounter Medications          Outpatient Encounter Prescriptions as of 6/20/2018   Medication Sig Dispense Refill   • acetaminophen (TYLENOL) 500 MG Tab Take 500-1,000 mg by mouth every 6 hours as needed.      "     No facility-administered encounter medications on file as of 6/20/2018.             SOCIAL HISTORY:    Social History               Social History   • Marital status:        Spouse name: N/A   • Number of children: N/A   • Years of education: N/A            Social History Main Topics   • Smoking status: Never Smoker   • Smokeless tobacco: Never Used   • Alcohol use 0.6 oz/week       1 Cans of beer per week   • Drug use: No   • Sexual activity: Not on file           Other Topics Concern   •  Service Yes   • Blood Transfusions No   • Caffeine Concern No   • Occupational Exposure No   • Hobby Hazards No   • Sleep Concern Yes   • Stress Concern No   • Weight Concern No   • Special Diet No   • Back Care Yes   • Exercise Yes   • Bike Helmet Yes   • Seat Belt Yes   • Self-Exams No          Social History Narrative   • No narrative on file            Review of Systems  reviewed, no changes noted  Constitutional: Negative for chills and fever.   HENT: Negative for ear discharge and ear pain.    Eyes: Negative for pain and discharge.   Respiratory: Negative for hemoptysis and sputum production.    Cardiovascular: Negative for orthopnea and claudication.   Gastrointestinal: Negative for constipation and diarrhea.   Genitourinary: Negative for frequency and hematuria.   Musculoskeletal: Positive for back pain, joint pain and myalgias.   Skin: Negative.    Endo/Heme/Allergies: Negative.    All other systems reviewed and are negative.         Objective:      /68   Pulse 66   Temp 36.2 °C (97.2 °F)   Ht 1.803 m (5' 11\")   Wt 98 kg (216 lb)   SpO2 95%   BMI 30.13 kg/m²    Physical exam:  Constitutional: oriented to person, place, and time, appears well-developed and well-nourished.   HEENT: Normocephalic atraumatic, neck supple, no JVD noted, no masses noted, no meningeal signs noted  Lymphadenopathy: no cervical, supraclavicular, or inguinal lymphadenopathy noted  Cardiovascular: Intact distal " pulses, including at ankles, no limb swelling noted  Pulmonary: No tachypnea noted, no accessory muscle use noted, no dyspnea noted  Abdominal: Soft, nontender, exhibits no distension, no peritoneal signs, no HSM  Musculoskeletal:   Right hip: exhibits no significant tenderness. Minimal pain with range of motion testing  Left hip: exhibits mild tenderness. Mild pain with range of motion testing  Lumbar back: exhibits mdecreased range of motion, tenderness and  pain. negative straight leg testing, trigger points noted, pain noted with quadrant loading and extension bilaterally, reproducing pain, only mild tenderness and sacroiliac joint region  Neurological: oriented to person, place, and time. Cranial nerves grossly intact, normal strength. Sensation intact distally. Reflexes 1+ in  lower limbs, Gait mildly antalgic, reciprocal  Skin: Skin is intact. no rashes or lesions noted  Psychiatric: normal mood and affect. speech is normal and behavior is normal. Judgment and thought content normal.       Assessment:      Lumbar spondylosis, suspect lumbar facet syndrome      Plan:      Diagnostic bilateral lumbar 3, 4, and 5 medial branch blocks, facet blocks, #2

## 2018-06-20 NOTE — PROGRESS NOTES
Subjective:      Roshan Bailey presents with Follow-Up        Subjective: Mr. Bailey returns to the office today for follow-up evaluation of spinal/joints/musculoskeletal pain.    The patient underwent diagnostic bilateral lumbar 3, 4, and 5 medial branch blocks, facet blocks, #1, on 6/5/2018. The patient notes approximately 75% pain relief from the local anesthetic phase, then soreness, then transient steroid response with short-term pain relief, then recurrence, with a slight change with the pain pattern.    The patient now notes ongoing moderately severe pain in the left greater than right lumbosacral region, constant, without overt radicular component. The pain limits his ability to function, including sitting, standing, and walking tolerance, also limits advance activities. He has had the pain for greater than 4 months.     He notes intermittent left hip area pain.    The patient notes intermittent left knee area pain, primarily with advanced activities. He also notes intermittent crepitus, occasional swelling, primarily activity associated.    The patient has had prior treatment with medications, including NSAIDs. He has been to physical therapy. No acute changes with bowel/bladder noted. No acute changes with strength noted. He is making an effort with home exercise program. The ongoing pain limits his ability to function. The patient is inquiring about further treatment options.      MEDICAL RECORDS REVIEW/DATA REVIEW: Reviewed in epic.    I reviewed medications. Tried NSAIDs. Desires to limit/avoid oral medications.    I reviewed diagnostic studies:     I reviewed radiographs.    Reviewed MRI lumbar spine 12/2017. Reviewed lumbar spine x-ray 10/2017, unremarkable.     Reviewed MRI of left knee 3/2018 revealed patellar cartilage fissuring with minimal underlying bone marrow edema, small effusion. Reviewed left knee x-rays 6/2017, unremarkable.    I reviewed lab studies.     I reviewed medical  issues.     I reviewed family history: No neuromuscular disorders noted.    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      PAST MEDICAL HISTORY: History reviewed. No pertinent past medical history.    PAST SURGICAL HISTORY:  History reviewed. No pertinent surgical history.    ALLERGIES:  Patient has no known allergies.    MEDICATIONS:    Outpatient Encounter Prescriptions as of 6/20/2018   Medication Sig Dispense Refill   • acetaminophen (TYLENOL) 500 MG Tab Take 500-1,000 mg by mouth every 6 hours as needed.       No facility-administered encounter medications on file as of 6/20/2018.        SOCIAL HISTORY:    Social History     Social History   • Marital status:      Spouse name: N/A   • Number of children: N/A   • Years of education: N/A     Social History Main Topics   • Smoking status: Never Smoker   • Smokeless tobacco: Never Used   • Alcohol use 0.6 oz/week     1 Cans of beer per week   • Drug use: No   • Sexual activity: Not on file     Other Topics Concern   •  Service Yes   • Blood Transfusions No   • Caffeine Concern No   • Occupational Exposure No   • Hobby Hazards No   • Sleep Concern Yes   • Stress Concern No   • Weight Concern No   • Special Diet No   • Back Care Yes   • Exercise Yes   • Bike Helmet Yes   • Seat Belt Yes   • Self-Exams No     Social History Narrative   • No narrative on file         Review of Systems  reviewed, no changes noted  Constitutional: Negative for chills and fever.   HENT: Negative for ear discharge and ear pain.    Eyes: Negative for pain and discharge.   Respiratory: Negative for hemoptysis and sputum production.    Cardiovascular: Negative for orthopnea and claudication.   Gastrointestinal: Negative for constipation and diarrhea.   Genitourinary: Negative for frequency and hematuria.  "  Musculoskeletal: Positive for back pain, joint pain and myalgias.   Skin: Negative.    Endo/Heme/Allergies: Negative.    All other systems reviewed and are negative.        Objective:     /68   Pulse 66   Temp 36.2 °C (97.2 °F)   Ht 1.803 m (5' 11\")   Wt 98 kg (216 lb)   SpO2 95%   BMI 30.13 kg/m²    Physical exam:  Constitutional: oriented to person, place, and time, appears well-developed and well-nourished.   HEENT: Normocephalic atraumatic, neck supple, no JVD noted, no masses noted, no meningeal signs noted  Lymphadenopathy: no cervical, supraclavicular, or inguinal lymphadenopathy noted  Cardiovascular: Intact distal pulses, including at ankles, no limb swelling noted  Pulmonary: No tachypnea noted, no accessory muscle use noted, no dyspnea noted  Abdominal: Soft, nontender, exhibits no distension, no peritoneal signs, no HSM  Musculoskeletal:   Right hip: exhibits no significant tenderness. Minimal pain with range of motion testing  Left hip: exhibits mild tenderness. Mild pain with range of motion testing  Lumbar back: exhibits mdecreased range of motion, tenderness and  pain. negative straight leg testing, trigger points noted, pain noted with quadrant loading and extension bilaterally, reproducing pain, only mild tenderness and sacroiliac joint region  Neurological: oriented to person, place, and time. Cranial nerves grossly intact, normal strength. Sensation intact distally. Reflexes 1+ in  lower limbs, Gait mildly antalgic, reciprocal  Skin: Skin is intact. no rashes or lesions noted  Psychiatric: normal mood and affect. speech is normal and behavior is normal. Judgment and thought content normal.          Assessment/Plan:       ASSESSMENT:    1. Lumbosacral pain, myofascial pain, disc bulge, annular tear, foraminal stenosis, degenerative disc disease, lumbar spondylosis, suspected lumbar facet syndrome    - Ordered lumbar spine x-rays with flexion/extension views  - Reviewed injection " therapy with diagnostic bilateral lumbar 3, 4, and 5 medial branch blocks, facet blocks, #2    2. Left hip area pain, sprain strain    - Ordered left hip x-rays    3. Left knee pain, sprain strain, patellar cartilage degeneration, effusion, relatively controlled    - Reviewed injection therapy with left knee intra-articular joint injection, if not responding to more conservative care      DISCUSSION/PLAN:    - I discussed management options. I reviewed symptomatic care    - Previously wrote letter 5/2018 recommending patient continue on profile/limited duty with [employer] for 3 months, then reevaluation    - I reviewed home exercise program and activity modification    - The patient can consider complementary trials with acupuncture, massage therapy, or TENS unit    - I reviewed medication monitoring. For now, continue current medications. I reviewed further symptomatic medications. I did not prescribe any medications today.    - I reviewed additional diagnostic options, including further/advanced imaging, electrodiagnostic testing, and further lab screen    - I reviewed additional therapeutic options, including further injection/interventional therapy and additional consultative input     - Coordinate follow-up after reviewing results from above-noted diagnostic studies, and getting injection authorization in place, or an as-needed basis      Please note that this dictation was created using voice recognition software. I have made every reasonable attempt to correct obvious errors but there may be errors of grammar and content that I may have overlooked prior to finalization of this note.

## 2018-06-23 ENCOUNTER — TELEPHONE (OUTPATIENT)
Dept: HOSPITALIST | Facility: MEDICAL CENTER | Age: 33
End: 2018-06-23

## 2018-06-23 NOTE — TELEPHONE ENCOUNTER
Please submit an authorization to [facility] for a continuation of care referral to Dr. Hermes Higgins from patient care coordination. Thank you.

## 2018-07-10 ENCOUNTER — HOSPITAL ENCOUNTER (OUTPATIENT)
Dept: RADIOLOGY | Facility: REHABILITATION | Age: 33
End: 2018-07-10
Attending: PHYSICAL MEDICINE & REHABILITATION
Payer: OTHER GOVERNMENT

## 2018-07-10 ENCOUNTER — HOSPITAL ENCOUNTER (OUTPATIENT)
Dept: PAIN MANAGEMENT | Facility: REHABILITATION | Age: 33
End: 2018-07-10
Attending: PHYSICAL MEDICINE & REHABILITATION
Payer: OTHER GOVERNMENT

## 2018-07-10 VITALS
SYSTOLIC BLOOD PRESSURE: 144 MMHG | TEMPERATURE: 98.7 F | HEIGHT: 71 IN | BODY MASS INDEX: 30.46 KG/M2 | OXYGEN SATURATION: 96 % | DIASTOLIC BLOOD PRESSURE: 88 MMHG | WEIGHT: 217.59 LBS | RESPIRATION RATE: 16 BRPM | HEART RATE: 69 BPM

## 2018-07-10 DIAGNOSIS — M62.838 MUSCLE SPASM: ICD-10-CM

## 2018-07-10 PROCEDURE — 700117 HCHG RX CONTRAST REV CODE 255

## 2018-07-10 PROCEDURE — 700101 HCHG RX REV CODE 250

## 2018-07-10 PROCEDURE — 64493 INJ PARAVERT F JNT L/S 1 LEV: CPT

## 2018-07-10 PROCEDURE — 700111 HCHG RX REV CODE 636 W/ 250 OVERRIDE (IP)

## 2018-07-10 PROCEDURE — 64494 INJ PARAVERT F JNT L/S 2 LEV: CPT

## 2018-07-10 RX ORDER — METHYLPREDNISOLONE ACETATE 80 MG/ML
INJECTION, SUSPENSION INTRA-ARTICULAR; INTRALESIONAL; INTRAMUSCULAR; SOFT TISSUE
Status: COMPLETED
Start: 2018-07-10 | End: 2018-07-10

## 2018-07-10 RX ORDER — DEXAMETHASONE SODIUM PHOSPHATE 10 MG/ML
INJECTION, SOLUTION INTRAMUSCULAR; INTRAVENOUS
Status: COMPLETED
Start: 2018-07-10 | End: 2018-07-10

## 2018-07-10 RX ORDER — TRIAMCINOLONE ACETONIDE 40 MG/ML
INJECTION, SUSPENSION INTRA-ARTICULAR; INTRAMUSCULAR
Status: COMPLETED
Start: 2018-07-10 | End: 2018-07-10

## 2018-07-10 RX ORDER — BUPIVACAINE HYDROCHLORIDE 5 MG/ML
INJECTION, SOLUTION EPIDURAL; INTRACAUDAL
Status: COMPLETED
Start: 2018-07-10 | End: 2018-07-10

## 2018-07-10 RX ORDER — LIDOCAINE HYDROCHLORIDE 10 MG/ML
INJECTION, SOLUTION EPIDURAL; INFILTRATION; INTRACAUDAL; PERINEURAL
Status: COMPLETED
Start: 2018-07-10 | End: 2018-07-10

## 2018-07-10 RX ORDER — CYCLOBENZAPRINE HCL 10 MG
TABLET ORAL
Qty: 15 TAB | Refills: 0 | Status: SHIPPED | OUTPATIENT
Start: 2018-07-10 | End: 2019-03-19

## 2018-07-10 RX ADMIN — IOHEXOL 2 ML: 240 INJECTION, SOLUTION INTRATHECAL; INTRAVASCULAR; INTRAVENOUS; ORAL at 13:12

## 2018-07-10 RX ADMIN — METHYLPREDNISOLONE ACETATE 80 MG: 80 INJECTION, SUSPENSION INTRA-ARTICULAR; INTRALESIONAL; INTRAMUSCULAR; SOFT TISSUE at 13:13

## 2018-07-10 RX ADMIN — LIDOCAINE HYDROCHLORIDE 10 ML: 10 INJECTION, SOLUTION EPIDURAL; INFILTRATION; INTRACAUDAL; PERINEURAL at 13:10

## 2018-07-10 RX ADMIN — BUPIVACAINE HYDROCHLORIDE 4 ML: 5 INJECTION, SOLUTION EPIDURAL; INTRACAUDAL; PERINEURAL at 13:13

## 2018-07-10 ASSESSMENT — PAIN SCALES - GENERAL
PAINLEVEL_OUTOF10: 6
PAINLEVEL_OUTOF10: 3

## 2018-07-10 NOTE — PROCEDURES
DATE OF SERVICE:  07/10/2018    DIAGNOSES:  Lumbar spondylosis, suspect lumbar facet syndrome.    PROCEDURE:  Diagnostic bilateral lumbar 3, 4, and 5 medial branch blocks,   facet blocks,#2    PROCEDURE NOTE:  Informed consent was obtained.  Risks, benefits, and   alternatives were discussed and all questions were answered.  The patient was   placed prone on the fluoroscopy table, and the skin area from T12-S4 was   prepped in a sterile manner with povidone-iodine.  The patient was monitored   throughout the procedure.    Fluoroscopy was turned on and positioned to identify the sacral ala and the   transverse process on left side of the L4 and L5 vertebrae.  Those areas were   then locally anesthetized with 2 mL of 1% preservative-free lidocaine.    Using fluoroscopy, 22-gauge spinal needles were placed at the superior border   of the transverse process and vertebral body at L4, L5 and the sacral ala,   confirmed on multiplanar imaging.  0.5-1 mL of Iohexol Omnipaque dye was   injected to verify location and ensure nonvascular flow.  Then, following   negative aspiration, 1 mL of mixture of 1 mL of methylprednisolone 80 mg/mL   and 5 mL of 0.5% preservative-free bupivacaine was injected at each site.  The  entire procedure was repeated on the contralateral side.  A total of 3 spinal  needles were used for the medial branch blocks.  There were no complications.    The patient was transported to recovery.  The patient reported preprocedure   pain of 6/10 and post-procedure pain of 3/10.  Postinjection instructions were  reviewed with the patient.  Then, the patient was discharged home in the care  of a friend and/or family.       ____________________________________     MD J LUIS HINOJOSA / ELLA    DD:  07/10/2018 15:02:27  DT:  07/10/2018 15:54:33    D#:  [id number]  Job#:  [id number]

## 2018-07-10 NOTE — PROGRESS NOTES
Reviewed Plan of Care with patient. Confirmed that he does not take any blood thinning medications. Confirmed that he has a . Reviewed home care instructions with patient prior to procedure. All questions and concerns addressed.   Dr Dawson in to evaluate patient.

## 2018-07-10 NOTE — PROGRESS NOTES
The patient seen today for lumbar injections. Prescribed a small amount of flexeril, which the patient has used in the past, tolerated, with benefit.

## 2018-07-11 ENCOUNTER — TELEPHONE (OUTPATIENT)
Dept: PHYSICAL MEDICINE AND REHAB | Facility: MEDICAL CENTER | Age: 33
End: 2018-07-11

## 2018-08-01 ENCOUNTER — OFFICE VISIT (OUTPATIENT)
Dept: PHYSICAL MEDICINE AND REHAB | Facility: MEDICAL CENTER | Age: 33
End: 2018-08-01
Payer: OTHER GOVERNMENT

## 2018-08-01 VITALS
WEIGHT: 214.73 LBS | DIASTOLIC BLOOD PRESSURE: 78 MMHG | BODY MASS INDEX: 30.06 KG/M2 | SYSTOLIC BLOOD PRESSURE: 118 MMHG | HEIGHT: 71 IN | OXYGEN SATURATION: 95 % | TEMPERATURE: 97.7 F | HEART RATE: 71 BPM

## 2018-08-01 DIAGNOSIS — M47.816 LUMBAR SPONDYLOSIS: ICD-10-CM

## 2018-08-01 DIAGNOSIS — M25.559 ARTHRALGIA OF HIP, UNSPECIFIED LATERALITY: ICD-10-CM

## 2018-08-01 DIAGNOSIS — M54.50 LUMBOSACRAL PAIN: ICD-10-CM

## 2018-08-01 DIAGNOSIS — M79.18 MYOFASCIAL PAIN: ICD-10-CM

## 2018-08-01 DIAGNOSIS — M51.36 DDD (DEGENERATIVE DISC DISEASE), LUMBAR: ICD-10-CM

## 2018-08-01 PROCEDURE — 99213 OFFICE O/P EST LOW 20 MIN: CPT | Performed by: PHYSICAL MEDICINE & REHABILITATION

## 2018-08-01 NOTE — PROGRESS NOTES
Subjective:      Roshan Bailey presents with Follow-Up        Subjective: Mr. Bailey returns to the office today for follow-up evaluation of spinal/joints/musculoskeletal pain.    The patient underwent diagnostic bilateral lumbar 3, 4, and 5 medial branch blocks, facet blocks, #2 on 7/10/2018.  The patient notes approximately 70% pain relief during the local anesthetic phase, then brief steroid response, then recurrence of pain to preinjection level.  Previously, the patient underwent diagnostic bilateral lumbar 3, 4, and 5 medial branch blocks, facet blocks, #1, on 6/5/2018. The patient notes approximately 75% pain relief from the local anesthetic phase, then soreness, then transient steroid response with short-term pain relief, then recurrence of pain.    The patient now notes ongoing moderately severe pain in the lumbosacral region bilaterally, constant, without overt radicular component. The pain limits his ability to function, including sitting, standing, and walking tolerance, also limits advance activities. He has had the pain for greater than 6 months.     He notes intermittent left hip area pain, controlled.    The patient notes intermittent left knee area pain, controlled, primarily with advanced activities. He also notes intermittent crepitus, occasional swelling, primarily activity associated.    The patient has had prior treatment with medications, including NSAIDs. He has been to physical therapy. No acute changes with bowel/bladder noted. No acute changes with strength noted. He is making an effort with home exercise program. The ongoing pain limits his ability to function. The patient is inquiring about further nonsurgical treatment options.      MEDICAL RECORDS REVIEW/DATA REVIEW: Reviewed in epic.    I reviewed medications. Tried NSAIDs. Desires to limit/avoid oral medications.    I reviewed diagnostic studies:     I reviewed radiographs.    Reviewed MRI lumbar spine 12/2017. Reviewed  lumbar spine x-ray 10/2017.     Reviewed MRI of left knee 3/2018. Reviewed left knee x-rays 6/2017.    I reviewed lab studies.  Reviewed labs 5/2018, including CMP, CBC, TFTs, A1c 5.8.    I reviewed medical issues.  Followed by primary care provider.    I reviewed family history: No neuromuscular disorders noted.    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      PAST MEDICAL HISTORY: History reviewed. No pertinent past medical history.    PAST SURGICAL HISTORY:  History reviewed. No pertinent surgical history.    ALLERGIES:  Patient has no known allergies.    MEDICATIONS:    Outpatient Encounter Prescriptions as of 8/1/2018   Medication Sig Dispense Refill   • acetaminophen (TYLENOL) 500 MG Tab Take 500-1,000 mg by mouth every 6 hours as needed.     • cyclobenzaprine (FLEXERIL) 10 MG Tab Take 1/2 to 1 tablet by mouth once per day as needed for muscle spasm 15 Tab 0     No facility-administered encounter medications on file as of 8/1/2018.        SOCIAL HISTORY:    Social History     Social History   • Marital status:      Spouse name: N/A   • Number of children: N/A   • Years of education: N/A     Social History Main Topics   • Smoking status: Never Smoker   • Smokeless tobacco: Never Used   • Alcohol use 0.6 oz/week     1 Cans of beer per week   • Drug use: No   • Sexual activity: Not on file     Other Topics Concern   •  Service Yes   • Blood Transfusions No   • Caffeine Concern No   • Occupational Exposure No   • Hobby Hazards No   • Sleep Concern Yes   • Stress Concern No   • Weight Concern No   • Special Diet No   • Back Care Yes   • Exercise Yes   • Bike Helmet Yes   • Seat Belt Yes   • Self-Exams No     Social History Narrative   • No narrative on file       Review of Systems Reviewed, no changes noted  Constitutional: Negative  "for chills and fever.   HENT: Negative for ear discharge and ear pain.    Eyes: Negative for pain and discharge.   Respiratory: Negative for hemoptysis and sputum production.    Cardiovascular: Negative for orthopnea and claudication.   Gastrointestinal: Negative for constipation and diarrhea.   Genitourinary: Negative for frequency and hematuria.   Musculoskeletal: Positive for back pain, joint pain and myalgias.   Skin: Negative.    Endo/Heme/Allergies: Negative.    All other systems reviewed and are negative.        Objective:     /78   Pulse 71   Temp 36.5 °C (97.7 °F)   Ht 1.803 m (5' 11\")   Wt 97.4 kg (214 lb 11.7 oz)   SpO2 95%   BMI 29.95 kg/m²      Physical exam:  Constitutional: oriented to person, place, and time, appears well-developed and well-nourished.   HEENT: Normocephalic atraumatic, neck supple, no JVD noted, no masses noted, no meningeal signs noted  Lymphadenopathy: no cervical, supraclavicular, or inguinal lymphadenopathy noted  Cardiovascular: Intact distal pulses, including at ankles, no limb swelling noted  Pulmonary: No tachypnea noted, no accessory muscle use noted, no dyspnea noted  Abdominal: Soft, nontender, exhibits no distension, no peritoneal signs, no HSM  Musculoskeletal:   Right hip: exhibits only mild tenderness. Minimal pain with range of motion testing  Left hip: exhibits only mild tenderness. Minimal pain with range of motion testing  Lumbar back: exhibits  decreased range of motion, tenderness and pain. negative straight leg testing, pain noted with quadrant loading and extension bilaterally, reproducing pain, only mild tenderness in sacroiliac joint region  Wrist/hand: mild pain with range of motion testing, negative tinel's at wrist, negative tinel's at elbows  Neurological: oriented to person, place, and time. Cranial nerves grossly intact, normal strength. Sensation intact distally. Reflexes 1+ in lower limbs, Gait mildly antalgic, reciprocal, no upper motor " neuron signs evident  Skin: Skin is intact. no rashes or lesions noted  Psychiatric: normal mood and affect. speech is normal and behavior is normal. Judgment and thought content normal.         Assessment/Plan:       ASSESSMENT:    1. Lumbosacral pain, myofascial pain, disc bulge, annular tear, foraminal stenosis, degenerative disc disease, lumbar spondylosis, lumbar facet syndrome    - Reviewed injection therapy with bilateral lumbar 3, 4, and 5 medial branch radiofrequency neurolysis.    - Note, the patient has ongoing and functional limiting low back pain despite previous treatment conservative trials with medications, including NSAIDs, physical therapy, home exercise program, and time.  He has had pain for greater than 6 months.  He has had two diagnostic lumbar medial branch blocks, with good, but only temporary relief.  The patient is a good candidate for neurolysis as a therapeutic intervention, reviewed risks, benefits, alternatives, patient desires to proceed    2. Left hip area pain, sprain strain, controlled    3. Left knee pain, sprain strain, patellar cartilage degeneration, effusion, relatively controlled      DISCUSSION/PLAN:    - I discussed management options. I reviewed symptomatic care    - I reviewed home exercise program    - The patient can consider complementary trials with acupuncture, massage therapy, or TENS unit    - I reviewed medication monitoring. For now, continue current medications. I reviewed further symptomatic medications.     - I reviewed additional diagnostic options, including further/advanced imaging, electrodiagnostic testing, and further lab screen    - I reviewed additional therapeutic options, including further injection/interventional therapy and additional consultative input     -I will plan on seeing the patient back in the procedure center for the above-noted intervention or in the office where we can further review management options      Please note that this  dictation was created using voice recognition software. I have made every reasonable attempt to correct obvious errors but there may be errors of grammar and content that I may have overlooked prior to finalization of this note.

## 2018-08-01 NOTE — PROGRESS NOTES
Special Procedures H&P:    Subjective: Mr. Bailey The patient now notes ongoing moderately severe pain in the lumbosacral region bilaterally, constant, without overt radicular component. The pain limits his ability to function, including sitting, standing, and walking tolerance, also limits advance activities. He has had the pain for greater than 6 months. The patient underwent diagnostic bilateral lumbar 3, 4, and 5 medial branch blocks, facet blocks, #2 on 7/10/2018.  The patient notes approximately 70% pain relief during the local anesthetic phase, then brief steroid response, then recurrence of pain to preinjection level.  Previously, the patient underwent diagnostic bilateral lumbar 3, 4, and 5 medial branch blocks, facet blocks, #1, on 6/5/2018. The patient notes approximately 75% pain relief from the local anesthetic phase, then soreness, then transient steroid response with short-term pain relief, then recurrence of pain. The patient has had prior treatment with medications, including NSAIDs. He has been to physical therapy. No acute changes with bowel/bladder noted. No acute changes with strength noted. He is making an effort with home exercise program. The ongoing pain limits his ability to function. The patient is inquiring about further nonsurgical treatment options.     MEDICAL RECORDS REVIEW/DATA REVIEW: Reviewed in epic.     I reviewed medications. Tried NSAIDs.      I reviewed diagnostic studies:      I reviewed radiographs. Reviewed MRI lumbar spine 12/2017. Reviewed lumbar spine x-ray 10/2017.       I reviewed lab studies.  Reviewed labs 5/2018, including CMP, CBC, TFTs, A1c 5.8.     I reviewed medical issues.  Followed by primary care provider.     I reviewed family history: No neuromuscular disorders noted.     I reviewed social issues.       PAST MEDICAL HISTORY:   Past Medical History   History reviewed. No pertinent past medical history.        PAST SURGICAL HISTORY:    Past Surgical  History   History reviewed. No pertinent surgical history.        ALLERGIES:  Patient has no known allergies.     MEDICATIONS:    Encounter Medications          Outpatient Encounter Prescriptions as of 8/1/2018   Medication Sig Dispense Refill   • acetaminophen (TYLENOL) 500 MG Tab Take 500-1,000 mg by mouth every 6 hours as needed.       • cyclobenzaprine (FLEXERIL) 10 MG Tab Take 1/2 to 1 tablet by mouth once per day as needed for muscle spasm 15 Tab 0      No facility-administered encounter medications on file as of 8/1/2018.             SOCIAL HISTORY:    Social History               Social History   • Marital status:        Spouse name: N/A   • Number of children: N/A   • Years of education: N/A            Social History Main Topics   • Smoking status: Never Smoker   • Smokeless tobacco: Never Used   • Alcohol use 0.6 oz/week       1 Cans of beer per week   • Drug use: No   • Sexual activity: Not on file           Other Topics Concern   •  Service Yes   • Blood Transfusions No   • Caffeine Concern No   • Occupational Exposure No   • Hobby Hazards No   • Sleep Concern Yes   • Stress Concern No   • Weight Concern No   • Special Diet No   • Back Care Yes   • Exercise Yes   • Bike Helmet Yes   • Seat Belt Yes   • Self-Exams No          Social History Narrative   • No narrative on file            Review of Systems Reviewed, no changes noted  Constitutional: Negative for chills and fever.   HENT: Negative for ear discharge and ear pain.    Eyes: Negative for pain and discharge.   Respiratory: Negative for hemoptysis and sputum production.    Cardiovascular: Negative for orthopnea and claudication.   Gastrointestinal: Negative for constipation and diarrhea.   Genitourinary: Negative for frequency and hematuria.   Musculoskeletal: Positive for back pain, joint pain and myalgias.   Skin: Negative.    Endo/Heme/Allergies: Negative.    All other systems reviewed and are negative.         Objective:      BP  "118/78   Pulse 71   Temp 36.5 °C (97.7 °F)   Ht 1.803 m (5' 11\")   Wt 97.4 kg (214 lb 11.7 oz)   SpO2 95%   BMI 29.95 kg/m²       Physical exam:  Constitutional: oriented to person, place, and time, appears well-developed and well-nourished.   HEENT: Normocephalic atraumatic, neck supple, no JVD noted, no masses noted, no meningeal signs noted  Lymphadenopathy: no cervical, supraclavicular, or inguinal lymphadenopathy noted  Cardiovascular: Intact distal pulses, including at ankles, no limb swelling noted  Pulmonary: No tachypnea noted, no accessory muscle use noted, no dyspnea noted  Abdominal: Soft, nontender, exhibits no distension, no peritoneal signs, no HSM  Musculoskeletal:   Right hip: exhibits only mild tenderness. Minimal pain with range of motion testing  Left hip: exhibits only mild tenderness. Minimal pain with range of motion testing  Lumbar back: exhibits  decreased range of motion, tenderness and pain. negative straight leg testing, pain noted with quadrant loading and extension bilaterally, reproducing pain, only mild tenderness in sacroiliac joint region  Wrist/hand: mild pain with range of motion testing, negative tinel's at wrist, negative tinel's at elbows  Neurological: oriented to person, place, and time. Cranial nerves grossly intact, normal strength. Sensation intact distally. Reflexes 1+ in lower limbs, Gait mildly antalgic, reciprocal, no upper motor neuron signs evident  Skin: Skin is intact. no rashes or lesions noted  Psychiatric: normal mood and affect. speech is normal and behavior is normal. Judgment and thought content normal.     Assessment:    Lumbar spondylosis, lumbar facet syndrome    Plan:    Bilateral lumbar 3, 4, and 5 medial branch radiofrequency neurolysis  "

## 2018-08-20 ENCOUNTER — TELEPHONE (OUTPATIENT)
Dept: PHYSICAL MEDICINE AND REHAB | Facility: MEDICAL CENTER | Age: 33
End: 2018-08-20

## 2018-08-20 NOTE — TELEPHONE ENCOUNTER
Roshan thought he may have to take a couple days off after his procedure tomorrow.     He would like to have letter for work. I let him know if Dr. Dawson agrees, he may be given letter tomorrow along with follow up appt reminder.     He said that would be fine.

## 2018-08-21 ENCOUNTER — HOSPITAL ENCOUNTER (OUTPATIENT)
Dept: RADIOLOGY | Facility: REHABILITATION | Age: 33
End: 2018-08-21
Attending: PHYSICAL MEDICINE & REHABILITATION
Payer: OTHER GOVERNMENT

## 2018-08-21 ENCOUNTER — HOSPITAL ENCOUNTER (OUTPATIENT)
Dept: PAIN MANAGEMENT | Facility: REHABILITATION | Age: 33
End: 2018-08-21
Attending: PHYSICAL MEDICINE & REHABILITATION
Payer: OTHER GOVERNMENT

## 2018-08-21 VITALS
RESPIRATION RATE: 18 BRPM | HEIGHT: 72 IN | DIASTOLIC BLOOD PRESSURE: 83 MMHG | TEMPERATURE: 98 F | HEART RATE: 61 BPM | WEIGHT: 212.96 LBS | SYSTOLIC BLOOD PRESSURE: 127 MMHG | OXYGEN SATURATION: 97 % | BODY MASS INDEX: 28.85 KG/M2

## 2018-08-21 PROCEDURE — 700101 HCHG RX REV CODE 250

## 2018-08-21 PROCEDURE — 64635 DESTROY LUMB/SAC FACET JNT: CPT

## 2018-08-21 PROCEDURE — 64636 DESTROY L/S FACET JNT ADDL: CPT

## 2018-08-21 PROCEDURE — 700111 HCHG RX REV CODE 636 W/ 250 OVERRIDE (IP)

## 2018-08-21 RX ORDER — BUPIVACAINE HYDROCHLORIDE 2.5 MG/ML
INJECTION, SOLUTION EPIDURAL; INFILTRATION; INTRACAUDAL
Status: COMPLETED
Start: 2018-08-21 | End: 2018-08-21

## 2018-08-21 RX ORDER — METHYLPREDNISOLONE ACETATE 80 MG/ML
INJECTION, SUSPENSION INTRA-ARTICULAR; INTRALESIONAL; INTRAMUSCULAR; SOFT TISSUE
Status: COMPLETED
Start: 2018-08-21 | End: 2018-08-21

## 2018-08-21 RX ORDER — MIDAZOLAM HYDROCHLORIDE 1 MG/ML
INJECTION INTRAMUSCULAR; INTRAVENOUS
Status: COMPLETED
Start: 2018-08-21 | End: 2018-08-21

## 2018-08-21 RX ORDER — LIDOCAINE HYDROCHLORIDE 10 MG/ML
INJECTION, SOLUTION EPIDURAL; INFILTRATION; INTRACAUDAL; PERINEURAL
Status: COMPLETED
Start: 2018-08-21 | End: 2018-08-21

## 2018-08-21 RX ORDER — BUPIVACAINE HYDROCHLORIDE 5 MG/ML
INJECTION, SOLUTION EPIDURAL; INTRACAUDAL
Status: COMPLETED
Start: 2018-08-21 | End: 2018-08-21

## 2018-08-21 RX ADMIN — LIDOCAINE HYDROCHLORIDE 10 ML: 10 INJECTION, SOLUTION EPIDURAL; INFILTRATION; INTRACAUDAL; PERINEURAL at 13:21

## 2018-08-21 RX ADMIN — BUPIVACAINE HYDROCHLORIDE 5 ML: 5 INJECTION, SOLUTION EPIDURAL; INTRACAUDAL; PERINEURAL at 13:19

## 2018-08-21 RX ADMIN — METHYLPREDNISOLONE ACETATE 80 MG: 80 INJECTION, SUSPENSION INTRA-ARTICULAR; INTRALESIONAL; INTRAMUSCULAR; SOFT TISSUE at 13:20

## 2018-08-21 ASSESSMENT — PAIN SCALES - GENERAL
PAINLEVEL_OUTOF10: 4
PAINLEVEL_OUTOF10: 4

## 2018-08-21 NOTE — PROGRESS NOTES
Received patient from procedure accompanied by RN, ambulatory.Fluids tolerated well. Ice compress applied to the affected area. Reviewed home care instructions and understood by patient.

## 2018-08-21 NOTE — PROGRESS NOTES
Medication reconciliation reviewed with patient. Denied taking any blood thinners and any  any anti- inflammatories medications. .Patient had a  .Home care form and verbal  instruction given to patient and verbalized understanding.Patient ate breakfast at 0800 AM and decided no sedation for this procedure. Dr. Dawson made aware & spoke to the patient . Hand off reported to NITESH Tobar RN.

## 2018-08-21 NOTE — PROCEDURES
DATE OF SERVICE:  08/21/2018    DIAGNOSES:  Lumbar spondylosis, lumbar facet syndrome.    PROCEDURE:  Bilateral lumbar 3, 4, and 5 medial branch radiofrequency   neurolysis.    PROCEDURE NOTE:  Prior to having this procedure, the patient had prior medial   branch blocks, facet blocks with significant relief of pain.  The patient was   made aware of the risks involved in the neurolysis including the possibility   of a neuroma and no response, despite the positive response with prior trials   or treatments.    Informed consent was obtained, risks, benefits, and alternatives discussed and  all questions were answered.  The patient was placed prone on the fluoroscopy  table, and the skin area from T12 to S4 was prepared in a sterile manner with  povidone-iodine.  The patient was monitored throughout the procedure.    The sacral ala was identified as well as the transverse process on the left   side of the L4 and L5 vertebra.  At each level, the overlying skin was   infiltrated with 2 mL of 1% preservative-free lidocaine.  At that point, an   18-gauge radiofrequency catheter with curved tips were placed by the medial   branch nerves under fluoroscopy by the L4 and L5 transverse process and sacral  ala, confirmed on multiplanar imaging, for the L3, L4, and L5 medial branch   nerves.  Once the radiofrequency catheter was in the correct position, using   the radiofrequency machine, the machine controls were then set at 2 Hz.  At 2   volts, there was no motor response.  Once satisfied, there was definitely no   nerve root stimulation and the medial branch nerves were located, 3 mL of a   mixture of 10 mL of 1% preservative-free lidocaine and 10 mL of 0.5%   preservative-free bupivacaine was injected at each site.  After waiting 2   minutes, each site was heated for 60 seconds at 80 degrees centigrade.  Three   burns were performed at each site.  Following the neurolysis, 1 mL of a   mixture of 1 mL of Depo-Medrol 80 mg/mL  and 5 mL of 1% preservative-free   lidocaine was injected at each site.  The same procedure was repeated on the   contralateral side.  A total of four radiofrequency catheters were used for   the neurolysis.  There were no complications.      Following the procedure, the patient was transferred to recovery.  Postinjection   instructions were reviewed with the patient and the patient was discharged home   in the care of a friend and/or family member.       ____________________________________     MD J LUIS HINOJOSA / ELLA    DD:  08/21/2018 14:24:56  DT:  08/21/2018 15:28:56    D#:  [id number]  Job#:  [id number]

## 2018-08-22 ENCOUNTER — TELEPHONE (OUTPATIENT)
Dept: PHYSICAL MEDICINE AND REHAB | Facility: MEDICAL CENTER | Age: 33
End: 2018-08-22

## 2018-08-27 NOTE — TELEPHONE ENCOUNTER
"I spoke with Roshan and he is not doing \"too bad\"from procedure last Tuesday. He said he has an uncomfortable feeling in rt hip and left leg area.     I offered sooner appt for him to discuss with Dr. Dawson and he declined.   I let him know to call us if it gets worse or not any better and I can get him in sooner.   "

## 2018-09-19 ENCOUNTER — OFFICE VISIT (OUTPATIENT)
Dept: PHYSICAL MEDICINE AND REHAB | Facility: MEDICAL CENTER | Age: 33
End: 2018-09-19
Payer: OTHER GOVERNMENT

## 2018-09-19 VITALS
HEIGHT: 71 IN | WEIGHT: 213.63 LBS | SYSTOLIC BLOOD PRESSURE: 116 MMHG | TEMPERATURE: 97.9 F | BODY MASS INDEX: 29.91 KG/M2 | HEART RATE: 76 BPM | OXYGEN SATURATION: 96 % | DIASTOLIC BLOOD PRESSURE: 72 MMHG

## 2018-09-19 DIAGNOSIS — M47.816 LUMBAR SPONDYLOSIS: ICD-10-CM

## 2018-09-19 DIAGNOSIS — M79.18 MYOFASCIAL PAIN: ICD-10-CM

## 2018-09-19 DIAGNOSIS — M25.559 ARTHRALGIA OF HIP, UNSPECIFIED LATERALITY: ICD-10-CM

## 2018-09-19 DIAGNOSIS — M54.50 LUMBOSACRAL PAIN: ICD-10-CM

## 2018-09-19 PROCEDURE — 99213 OFFICE O/P EST LOW 20 MIN: CPT | Performed by: PHYSICAL MEDICINE & REHABILITATION

## 2018-09-19 RX ORDER — IBUPROFEN 200 MG
200 TABLET ORAL EVERY 6 HOURS PRN
COMMUNITY

## 2018-09-19 NOTE — PROGRESS NOTES
Subjective:      Roshan Bailey presents with Follow-Up        Subjective: Mr. Bailey returns to the office today for follow-up evaluation of spinal/joints/musculoskeletal pain.    The patient underwent bilateral  lumbar 3, 4, and 5 medial branch radiofrequency neurolysis on 8/21/2018.  The patient notes residual soreness, then improvement with pain, change with his pain pattern.    The patient now notes the right lumbosacral pain is feeling approximately 50% better, controlled, without radicular component.    The patient now notes residual pain in the left lumbosacral region, neuropathic component, without overt radicular component.  The residual pain is limiting his ability to function.    He notes intermittent left hip area pain    The patient notes intermittent left knee area pain, controlled, primarily with advanced activities. He also notes intermittent crepitus, occasional swelling, primarily activity associated.    The patient has had prior treatment with medications, including NSAIDs. He has been to physical therapy. No acute changes with bowel/bladder noted. No acute changes with strength noted. He is making an effort with home exercise program. The ongoing pain limits his ability to function. The patient is inquiring about further nonsurgical treatment options.      MEDICAL RECORDS REVIEW/DATA REVIEW: Reviewed in epic.    I reviewed medications. Tried NSAIDs. Desires to limit/avoid oral medications.    I reviewed diagnostic studies:     I reviewed radiographs.    Reviewed MRI lumbar spine 12/2017. Reviewed lumbar spine x-ray 10/2017.     Reviewed MRI of left knee 3/2018. Reviewed left knee x-rays 6/2017.    I reviewed lab studies.  Reviewed labs 5/2018, including CMP, CBC, TFTs, A1c 5.8.    I reviewed medical issues.  Followed by primary care provider.    I reviewed family history: No neuromuscular disorders noted.    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 No pertinent past medical history.    PAST SURGICAL HISTORY:  History reviewed. No pertinent surgical history.    ALLERGIES:  Patient has no known allergies.    MEDICATIONS:    Outpatient Encounter Prescriptions as of 9/19/2018   Medication Sig Dispense Refill   • ibuprofen (MOTRIN) 200 MG Tab Take 200 mg by mouth every 6 hours as needed.     • cyclobenzaprine (FLEXERIL) 10 MG Tab Take 1/2 to 1 tablet by mouth once per day as needed for muscle spasm 15 Tab 0   • acetaminophen (TYLENOL) 500 MG Tab Take 500-1,000 mg by mouth every 6 hours as needed.       No facility-administered encounter medications on file as of 9/19/2018.        SOCIAL HISTORY:    Social History     Social History   • Marital status:      Spouse name: N/A   • Number of children: N/A   • Years of education: N/A     Social History Main Topics   • Smoking status: Never Smoker   • Smokeless tobacco: Never Used   • Alcohol use 0.6 oz/week     1 Cans of beer per week   • Drug use: No   • Sexual activity: Not on file     Other Topics Concern   •  Service Yes   • Blood Transfusions No   • Caffeine Concern No   • Occupational Exposure No   • Hobby Hazards No   • Sleep Concern Yes   • Stress Concern No   • Weight Concern No   • Special Diet No   • Back Care Yes   • Exercise Yes   • Bike Helmet Yes   • Seat Belt Yes   • Self-Exams No     Social History Narrative   • No narrative on file       Review of Systems Reviewed, no changes noted  Constitutional: Negative for chills and fever.   HENT: Negative for ear discharge and ear pain.    Eyes: Negative for pain and discharge.   Respiratory: Negative for hemoptysis and sputum production.    Cardiovascular: Negative for orthopnea and claudication.   Gastrointestinal: Negative for constipation and diarrhea.   Genitourinary: Negative for frequency and  "hematuria.   Musculoskeletal: Positive for back pain, joint pain and myalgias.   Skin: Negative.    Endo/Heme/Allergies: Negative.    All other systems reviewed and are negative.        Objective:     /72 (BP Location: Right arm, Patient Position: Sitting, BP Cuff Size: Adult)   Pulse 76   Temp 36.6 °C (97.9 °F) (Temporal)   Ht 1.803 m (5' 11\")   Wt 96.9 kg (213 lb 10 oz)   SpO2 96%   BMI 29.79 kg/m²      Physical exam:  Constitutional: oriented to person, place, and time, appears well-developed and well-nourished.   HEENT: Normocephalic atraumatic, neck supple, no JVD noted, no masses noted, no meningeal signs noted  Lymphadenopathy: no cervical, supraclavicular, or inguinal lymphadenopathy noted  Cardiovascular: Intact distal pulses, including at ankles, no limb swelling noted  Pulmonary: No tachypnea noted, no accessory muscle use noted, no dyspnea noted  Abdominal: Soft, nontender, exhibits no distension, no peritoneal signs, no HSM  Musculoskeletal:   Right hip: exhibits no significant tenderness.  No significant pain with range of motion testing  Left hip: exhibits mild tenderness. Mild pain with range of motion testing  Lumbar back: exhibits  decreased range of motion, tenderness and  pain. negative straight leg testing, trigger points noted left lumbosacral region, mild tenderness left sacroiliac joint region, only mild pain noted with quadrant loading and extension  Neurological: oriented to person, place, and time. Cranial nerves grossly intact, normal strength. Sensation intact distally. Reflexes 1+ in lower limbs, mildly antalgic, reciprocal  Skin: Skin is intact. no rashes or lesions noted  Psychiatric: normal mood and affect. speech is normal and behavior is normal. Judgment and thought content normal. Cognition and memory are normal.         Assessment/Plan:       ASSESSMENT:    1. Lumbosacral pain, myofascial pain, disc bulge, annular tear, foraminal stenosis, degenerative disc disease, " lumbar spondylosis, lumbar facet syndrome, residual pain now localized to the left lumbosacral region    - Obtain updated lumbar spine x-rays  - Reviewed injection therapy with trigger point injections to treat the residual myofascial component to the ongoing pain, submit authorization request  - Work note written    2. Left hip area pain, sprain strain    - Obtain left hip x-ray    3. Left knee pain, sprain strain, patellar cartilage degeneration, effusion, relatively controlled      DISCUSSION/PLAN:    - I discussed management options. I reviewed symptomatic care    - I reviewed home exercise program    - The patient can consider complementary trials with acupuncture, massage therapy, or TENS unit    - I reviewed medication monitoring.  The patient can consider trial with Lidoderm patch or over-the-counter equivalent for residual neuropathic pain.  I reviewed risks, side effects, and interactions of medications, including over-the-counter medications I reviewed further symptomatic medications.     - I reviewed additional diagnostic options, including further/advanced imaging, electrodiagnostic testing, and further lab screen    - I reviewed additional therapeutic options, including further injection/interventional therapy and additional consultative input     -Return after the above-noted radiographs, after getting injection authorization in place, or an as-needed basis      Please note that this dictation was created using voice recognition software. I have made every reasonable attempt to correct obvious errors but there may be errors of grammar and content that I may have overlooked prior to finalization of this note.

## 2018-09-19 NOTE — LETTER
September 19, 2018        Roshan Bailey  [address] [place] [address]          To Whom This May Concern,     Roshan was seen today regarding low back and left knee pain. I recommend continue on profile, including no running or sit ups.     He is scheduled for spine injections.     We can re-evaluate in 3 months.      If you have any questions or concerns, please don't hesitate to call.          Sincerely,        Hermes Dawson M.D.    Electronically Signed

## 2018-09-24 ENCOUNTER — APPOINTMENT (OUTPATIENT)
Dept: PHYSICAL MEDICINE AND REHAB | Facility: MEDICAL CENTER | Age: 33
End: 2018-09-24
Payer: OTHER GOVERNMENT

## 2018-11-14 ENCOUNTER — HOSPITAL ENCOUNTER (OUTPATIENT)
Dept: RADIOLOGY | Facility: MEDICAL CENTER | Age: 33
End: 2018-11-14
Attending: PHYSICAL MEDICINE & REHABILITATION
Payer: OTHER GOVERNMENT

## 2018-11-14 ENCOUNTER — HOSPITAL ENCOUNTER (OUTPATIENT)
Dept: LAB | Facility: MEDICAL CENTER | Age: 33
End: 2018-11-14
Attending: EMERGENCY MEDICINE
Payer: OTHER GOVERNMENT

## 2018-11-14 ENCOUNTER — OFFICE VISIT (OUTPATIENT)
Dept: URGENT CARE | Facility: PHYSICIAN GROUP | Age: 33
End: 2018-11-14
Payer: OTHER GOVERNMENT

## 2018-11-14 VITALS
RESPIRATION RATE: 16 BRPM | HEART RATE: 83 BPM | DIASTOLIC BLOOD PRESSURE: 70 MMHG | WEIGHT: 213 LBS | TEMPERATURE: 97.9 F | BODY MASS INDEX: 29.82 KG/M2 | OXYGEN SATURATION: 96 % | SYSTOLIC BLOOD PRESSURE: 112 MMHG | HEIGHT: 71 IN

## 2018-11-14 DIAGNOSIS — M47.816 LUMBAR SPONDYLOSIS: ICD-10-CM

## 2018-11-14 DIAGNOSIS — M54.50 LUMBOSACRAL PAIN: ICD-10-CM

## 2018-11-14 DIAGNOSIS — H20.9 UVEITIS: ICD-10-CM

## 2018-11-14 DIAGNOSIS — M25.552 LEFT HIP PAIN: ICD-10-CM

## 2018-11-14 LAB — ERYTHROCYTE [SEDIMENTATION RATE] IN BLOOD BY WESTERGREN METHOD: 4 MM/HOUR (ref 0–15)

## 2018-11-14 PROCEDURE — 72110 X-RAY EXAM L-2 SPINE 4/>VWS: CPT

## 2018-11-14 PROCEDURE — 36415 COLL VENOUS BLD VENIPUNCTURE: CPT

## 2018-11-14 PROCEDURE — 85652 RBC SED RATE AUTOMATED: CPT

## 2018-11-14 PROCEDURE — 86780 TREPONEMA PALLIDUM: CPT

## 2018-11-14 PROCEDURE — 99214 OFFICE O/P EST MOD 30 MIN: CPT | Performed by: EMERGENCY MEDICINE

## 2018-11-14 PROCEDURE — 86618 LYME DISEASE ANTIBODY: CPT

## 2018-11-14 PROCEDURE — 73502 X-RAY EXAM HIP UNI 2-3 VIEWS: CPT | Mod: LT

## 2018-11-14 RX ORDER — PREDNISOLONE ACETATE 10 MG/ML
1 SUSPENSION/ DROPS OPHTHALMIC 4 TIMES DAILY
COMMUNITY
End: 2019-03-19

## 2018-11-14 ASSESSMENT — ENCOUNTER SYMPTOMS
VOMITING: 0
NAUSEA: 0
SPEECH CHANGE: 0
PALPITATIONS: 0
EYE DISCHARGE: 0
EYE REDNESS: 0
FEVER: 0
CHILLS: 0
EYE PAIN: 0
MYALGIAS: 1
SENSORY CHANGE: 0
NERVOUS/ANXIOUS: 0

## 2018-11-14 ASSESSMENT — VISUAL ACUITY
OS_CC: 20/25
OD_CC: 20/15

## 2018-11-15 LAB — TREPONEMA PALLIDUM IGG+IGM AB [PRESENCE] IN SERUM OR PLASMA BY IMMUNOASSAY: NON REACTIVE

## 2018-11-16 ENCOUNTER — TELEPHONE (OUTPATIENT)
Dept: URGENT CARE | Facility: PHYSICIAN GROUP | Age: 33
End: 2018-11-16

## 2018-11-16 LAB — B BURGDOR AB SER IA-ACNC: 0.59 LIV (ref 0–1.2)

## 2018-12-20 ENCOUNTER — OFFICE VISIT (OUTPATIENT)
Dept: PHYSICAL MEDICINE AND REHAB | Facility: MEDICAL CENTER | Age: 33
End: 2018-12-20
Payer: OTHER GOVERNMENT

## 2018-12-20 VITALS
TEMPERATURE: 97.5 F | BODY MASS INDEX: 30.62 KG/M2 | HEART RATE: 85 BPM | OXYGEN SATURATION: 95 % | WEIGHT: 218.7 LBS | SYSTOLIC BLOOD PRESSURE: 118 MMHG | HEIGHT: 71 IN | DIASTOLIC BLOOD PRESSURE: 78 MMHG

## 2018-12-20 DIAGNOSIS — M47.816 LUMBAR SPONDYLOSIS: ICD-10-CM

## 2018-12-20 DIAGNOSIS — M51.36 DDD (DEGENERATIVE DISC DISEASE), LUMBAR: ICD-10-CM

## 2018-12-20 DIAGNOSIS — M25.852 LEFT HIP IMPINGEMENT SYNDROME: ICD-10-CM

## 2018-12-20 DIAGNOSIS — M79.18 MYOFASCIAL PAIN: ICD-10-CM

## 2018-12-20 DIAGNOSIS — M54.50 LUMBOSACRAL PAIN: ICD-10-CM

## 2018-12-20 DIAGNOSIS — M25.552 LEFT HIP PAIN: ICD-10-CM

## 2018-12-20 PROCEDURE — 99213 OFFICE O/P EST LOW 20 MIN: CPT | Performed by: PHYSICAL MEDICINE & REHABILITATION

## 2018-12-20 NOTE — PROGRESS NOTES
Subjective:      Roshan Bailey presents with Follow-Up        Subjective: Mr. Bailey returns to the office today for follow-up evaluation of spinal/joints/musculoskeletal pain.    The patient notes ongoing pain most prominent the left posterior pelvic/hip region, worse with activities, limiting his ability to function.    Regarding the lumbosacral region, the patient notes change with his pain pattern since the bilateral lumbar 3, 4, and 5 medial branch radiofrequency neurolysis on 8/21/2018.  The patient now notes residual pain in the left lumbosacral region, without overt radicular component.      The patient notes intermittent left knee area pain, controlled, primarily with advanced activities. He also notes intermittent crepitus, occasional swelling, primarily activity associated.    The patient has had prior treatment with medications. He has been to physical therapy. No acute changes with bowel/bladder noted. No acute changes with strength noted. He is making an effort with home exercise program. The ongoing pain limits his ability to function. The patient is inquiring about further treatment options.      MEDICAL RECORDS REVIEW/DATA REVIEW: Reviewed in epic.    I reviewed medications. Tried NSAIDs. Desires to limit/avoid oral medications.    I reviewed diagnostic studies:     I reviewed radiographs.    Reviewed left hip x-rays 11/2018, I reviewed images and report, showed mild hip arthritis, findings suggestive of impingement.    Reviewed MRI lumbar spine 12/2017. Reviewed lumbar spine x-ray 11/2018, I reviewed images and report, showed trace listhesis/instability on flexion-extension at L4-5 and L3-4, not reported on radiologist report    Reviewed MRI of left knee 3/2018. Reviewed left knee x-rays 6/2017.    I reviewed lab studies.  Reviewed labs 5/2018, including CMP, CBC, TFTs, A1c 5.8.    I reviewed medical issues.  Followed by primary care provider.    I reviewed family history: No  neuromuscular disorders noted.    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 No pertinent past medical history.    PAST SURGICAL HISTORY:  History reviewed. No pertinent surgical history.    ALLERGIES:  Patient has no known allergies.    MEDICATIONS:    Outpatient Encounter Prescriptions as of 12/20/2018   Medication Sig Dispense Refill   • ibuprofen (MOTRIN) 200 MG Tab Take 200 mg by mouth every 6 hours as needed.     • acetaminophen (TYLENOL) 500 MG Tab Take 500-1,000 mg by mouth every 6 hours as needed.     • prednisoLONE acetate (PRED FORTE) 1 % Suspension Place 1 Drop in both eyes 4 times a day.     • cyclobenzaprine (FLEXERIL) 10 MG Tab Take 1/2 to 1 tablet by mouth once per day as needed for muscle spasm (Patient not taking: Reported on 12/20/2018) 15 Tab 0     No facility-administered encounter medications on file as of 12/20/2018.        SOCIAL HISTORY:    Social History     Social History   • Marital status:      Spouse name: N/A   • Number of children: N/A   • Years of education: N/A     Social History Main Topics   • Smoking status: Never Smoker   • Smokeless tobacco: Never Used   • Alcohol use 0.6 oz/week     1 Cans of beer per week   • Drug use: No   • Sexual activity: Not on file     Other Topics Concern   •  Service Yes   • Blood Transfusions No   • Caffeine Concern No   • Occupational Exposure No   • Hobby Hazards No   • Sleep Concern Yes   • Stress Concern No   • Weight Concern No   • Special Diet No   • Back Care Yes   • Exercise Yes   • Bike Helmet Yes   • Seat Belt Yes   • Self-Exams No     Social History Narrative   • No narrative on file       Review of Systems Reviewed, no changes noted  Constitutional: Negative for chills and fever.   HENT: Negative for ear discharge and ear pain.    Eyes: Negative for pain and discharge.  "  Respiratory: Negative for hemoptysis and sputum production.    Cardiovascular: Negative for orthopnea and claudication.   Gastrointestinal: Negative for constipation and diarrhea.   Genitourinary: Negative for frequency and hematuria.   Musculoskeletal: Positive for back pain, joint pain and myalgias.   Skin: Negative.    Endo/Heme/Allergies: Negative.    All other systems reviewed and are negative.        Objective:     /78 (BP Location: Left arm, Patient Position: Sitting, BP Cuff Size: Adult)   Pulse 85   Temp 36.4 °C (97.5 °F) (Temporal)   Ht 1.803 m (5' 11\")   Wt 99.2 kg (218 lb 11.1 oz)   SpO2 95%   BMI 30.50 kg/m²      Physical exam:  Constitutional: oriented to person, place, and time, appears well-developed and well-nourished.   HEENT: Normocephalic atraumatic, neck supple, no JVD noted, no masses noted, no meningeal signs noted  Lymphadenopathy: no cervical, supraclavicular, or inguinal lymphadenopathy noted  Cardiovascular: Intact distal pulses, including at ankles, no limb swelling noted  Pulmonary: No tachypnea noted, no accessory muscle use noted, no dyspnea noted  Abdominal: Soft, nontender, exhibits no distension, no peritoneal signs, no HSM  Musculoskeletal:   Right hip: exhibits no significant tenderness.  No significant pain with range of motion testing  Left hip: exhibits  tenderness.  pain with range of motion testing, reproducing pain  Lumbar back: exhibits mild decreased range of motion, mild tenderness and mild pain. negative straight leg testing, minimal tenderness with palpation and sacroiliac joint region, only mild pain with quadrant loading and extension, trigger points noted  Neurological: oriented to person, place, and time. Cranial nerves grossly intact, normal strength. Sensation intact distally. Reflexes 1-2+ in  lower limbs, Gait mildly antalgic, reciprocal  Skin: Skin is intact. no rashes or lesions noted  Psychiatric: normal mood and affect. speech is normal and " behavior is normal. Judgment and thought content normal.         Assessment/Plan:       ASSESSMENT:    1. Left hip area pain, sprain strain, impingement syndrome, consider labral pathology versus soft tissue pathology    - Ordered MRI left hip with intra-articular gadolinium  - Ordered BMP, needed prior to gadolinium administration    2. Lumbosacral pain, myofascial pain, disc bulge, annular tear, foraminal stenosis, degenerative disc disease, mild instability, lumbar spondylosis, lumbar facet syndrome    3. Left knee pain, sprain strain, patellar cartilage degeneration, effusion, relatively controlled      DISCUSSION/PLAN:    - I discussed management options. I reviewed symptomatic care    - I reviewed home exercise program    - Updated work note/letter    - Reviewed injection therapy with trigger point injections to treat the myofascial component to the ongoing pain, if not responding to more conservative care, patient to consider    - The patient can consider complementary trials with acupuncture, massage therapy, or TENS unit    - I reviewed medication monitoring.  The patient can consider trial with Lidoderm patch or over-the-counter equivalent for residual neuropathic pain.  I reviewed risks, side effects, and interactions of medications, including over-the-counter medications I reviewed further symptomatic medications.     - I reviewed additional diagnostic options, including further/advanced imaging, electrodiagnostic testing, and further lab screen    - I reviewed additional therapeutic options, including further injection/interventional therapy and additional consultative input     -Return after the above-noted diagnostic study or an as-needed basis      Please note that this dictation was created using voice recognition software. I have made every reasonable attempt to correct obvious errors but there may be errors of grammar and content that I may have overlooked prior to finalization of this  note.

## 2018-12-20 NOTE — LETTER
December 20, 2018        Roshan Palirez  [address] [place] [address]      To Whom This May Concern,     Roshan was seen today regarding low back and left knee pain. I recommend continue on profile, including no running or sit ups.     We can re-evaluate in 3 months.      If you have any questions or concerns, please don't hesitate to call.        If you have any questions or concerns, please don't hesitate to call.        Sincerely,        Hermes Dawson M.D.    Electronically Signed

## 2019-01-07 ENCOUNTER — TELEPHONE (OUTPATIENT)
Dept: HEALTH INFORMATION MANAGEMENT | Facility: OTHER | Age: 34
End: 2019-01-07

## 2019-02-06 ENCOUNTER — HOSPITAL ENCOUNTER (OUTPATIENT)
Dept: RADIOLOGY | Facility: MEDICAL CENTER | Age: 34
End: 2019-02-06
Attending: PHYSICAL MEDICINE & REHABILITATION
Payer: OTHER GOVERNMENT

## 2019-02-06 DIAGNOSIS — M25.852 LEFT HIP IMPINGEMENT SYNDROME: ICD-10-CM

## 2019-02-06 DIAGNOSIS — M25.552 LEFT HIP PAIN: ICD-10-CM

## 2019-02-06 PROCEDURE — 700117 HCHG RX CONTRAST REV CODE 255: Performed by: PHYSICAL MEDICINE & REHABILITATION

## 2019-02-06 PROCEDURE — 27093 INJECTION FOR HIP X-RAY: CPT | Mod: LT

## 2019-02-06 PROCEDURE — 73722 MRI JOINT OF LWR EXTR W/DYE: CPT | Mod: LT

## 2019-02-06 PROCEDURE — A9585 GADOBUTROL INJECTION: HCPCS | Performed by: PHYSICAL MEDICINE & REHABILITATION

## 2019-02-06 RX ORDER — GADOBUTROL 604.72 MG/ML
2 INJECTION INTRAVENOUS ONCE
Status: COMPLETED | OUTPATIENT
Start: 2019-02-06 | End: 2019-02-06

## 2019-02-06 RX ADMIN — GADOBUTROL 1 ML: 604.72 INJECTION INTRAVENOUS at 13:40

## 2019-02-06 RX ADMIN — IOHEXOL 5 ML: 300 INJECTION, SOLUTION INTRAVENOUS at 13:40

## 2019-02-12 ENCOUNTER — APPOINTMENT (OUTPATIENT)
Dept: MEDICAL GROUP | Facility: MEDICAL CENTER | Age: 34
End: 2019-02-12
Payer: OTHER GOVERNMENT

## 2019-02-12 ENCOUNTER — OFFICE VISIT (OUTPATIENT)
Dept: URGENT CARE | Facility: PHYSICIAN GROUP | Age: 34
End: 2019-02-12
Payer: OTHER GOVERNMENT

## 2019-02-12 VITALS
SYSTOLIC BLOOD PRESSURE: 118 MMHG | DIASTOLIC BLOOD PRESSURE: 80 MMHG | HEART RATE: 79 BPM | WEIGHT: 218 LBS | BODY MASS INDEX: 30.4 KG/M2 | TEMPERATURE: 97 F | OXYGEN SATURATION: 95 %

## 2019-02-12 DIAGNOSIS — J02.9 VIRAL PHARYNGITIS: ICD-10-CM

## 2019-02-12 LAB
INT CON NEG: NEGATIVE
INT CON POS: POSITIVE
S PYO AG THROAT QL: NEGATIVE

## 2019-02-12 PROCEDURE — 99214 OFFICE O/P EST MOD 30 MIN: CPT | Performed by: FAMILY MEDICINE

## 2019-02-12 PROCEDURE — 87880 STREP A ASSAY W/OPTIC: CPT | Performed by: FAMILY MEDICINE

## 2019-02-12 RX ORDER — FLUTICASONE PROPIONATE 50 MCG
1 SPRAY, SUSPENSION (ML) NASAL DAILY
Qty: 1 BOTTLE | Refills: 0 | Status: SHIPPED | OUTPATIENT
Start: 2019-02-12 | End: 2019-03-19

## 2019-02-12 RX ORDER — BENZONATATE 200 MG/1
200 CAPSULE ORAL 3 TIMES DAILY PRN
Qty: 45 CAP | Refills: 0 | Status: SHIPPED | OUTPATIENT
Start: 2019-02-12 | End: 2019-03-19

## 2019-02-12 NOTE — PROGRESS NOTES
Subjective:     CC:  presents with Pharyngitis            Pharyngitis   This is a new problem. The current episode started in the past 2 days. The problem has been unchanged. There has been no fever. The pain is mild. Associated symptoms include a hoarse voice, dry cough,  runny nose . Pertinent negatives include no abdominal pain,  ,  , diarrhea, headaches, shortness of breath or vomiting. no exposure to strep or mono.   has tried acetaminophen for the symptoms. The treatment provided mild relief.     Social History   Substance Use Topics   • Smoking status: Never Smoker   • Smokeless tobacco: Never Used   • Alcohol use 0.6 oz/week     1 Cans of beer per week          Review of Systems   Constitutional: Positive for malaise/fatigue. Negative for fever and weight loss.   HENT: Positive for hoarse voice and trouble swallowing. Negative for congestion.    Respiratory: Negative for  , sputum production and shortness of breath.    Cardiovascular: Negative for chest pain.   Gastrointestinal: Negative for nausea, vomiting, abdominal pain and diarrhea.   Genitourinary: Negative.    Neurological: Negative for dizziness and headaches.   All other systems reviewed and are negative.         Objective:   Blood pressure 118/80, pulse 79, temperature 36.1 °C (97 °F), weight 98.9 kg (218 lb), SpO2 95 %.        Physical Exam   Constitutional:   oriented to person, place, and time.  appears well-developed and well-nourished. No distress.   HENT:   Head: Normocephalic and atraumatic.   Right Ear: External ear normal.   Left Ear: External ear normal.   Nose: Mucosal edema present. Right sinus exhibits no maxillary sinus tenderness and no frontal sinus tenderness. Left sinus exhibits no maxillary sinus tenderness and no frontal sinus tenderness.   Mouth/Throat: no posterior oropharyngeal exudate.   There is posterior oropharyngeal erythema present. No posterior oropharyngeal edema.   Tonsils 2+ bilaterally     Eyes: Conjunctivae and  EOM are normal. Pupils are equal, round, and reactive to light. Right eye exhibits no discharge. Left eye exhibits no discharge. No scleral icterus.   Neck: Normal range of motion. Neck supple. No JVD present. No tracheal deviation present. No thyromegaly present.   Cardiovascular: Normal rate, regular rhythm, normal heart sounds and intact distal pulses.  Exam reveals no friction rub.    No murmur heard.  Pulmonary/Chest: Effort normal and breath sounds normal. No respiratory distress.   no wheezes.   no rales.    Musculoskeletal:  exhibits no edema.   Lymphadenopathy: no cervical LAD  Neurological:   alert and oriented to person, place, and time.   Skin: Skin is warm and dry. No erythema.   Psychiatric:   normal mood and affect.   Nursing note and vitals reviewed.             Assessment/Plan:     1. Viral pharyngitis      rapid strep negative.      - POCT Rapid Strep A  - fluticasone (FLONASE) 50 MCG/ACT nasal spray; Spray 1 Spray in nose every day.  Dispense: 1 Bottle; Refill: 0  - benzonatate (TESSALON) 200 MG capsule; Take 1 Cap by mouth 3 times a day as needed for Cough.  Dispense: 45 Cap; Refill: 0    Follow up in one week if no improvement

## 2019-02-12 NOTE — LETTER
February 12, 2019         Patient: Roshan Bailey   YOB: 1985   Date of Visit: 2/12/2019           To Whom it May Concern:    Roshan Bailey was seen in my clinic on 2/12/2019. He may return to work on Thursday.    If you have any questions or concerns, please don't hesitate to call.        Sincerely,           Nicholas Pantoja M.D.  Electronically Signed

## 2019-02-15 ENCOUNTER — OFFICE VISIT (OUTPATIENT)
Dept: PHYSICAL MEDICINE AND REHAB | Facility: MEDICAL CENTER | Age: 34
End: 2019-02-15
Payer: OTHER GOVERNMENT

## 2019-02-15 VITALS
WEIGHT: 218.92 LBS | BODY MASS INDEX: 30.65 KG/M2 | DIASTOLIC BLOOD PRESSURE: 64 MMHG | SYSTOLIC BLOOD PRESSURE: 116 MMHG | HEIGHT: 71 IN | HEART RATE: 86 BPM | TEMPERATURE: 97.5 F | OXYGEN SATURATION: 95 %

## 2019-02-15 DIAGNOSIS — M25.559 ARTHRALGIA OF HIP, UNSPECIFIED LATERALITY: ICD-10-CM

## 2019-02-15 DIAGNOSIS — M54.50 LUMBOSACRAL PAIN: ICD-10-CM

## 2019-02-15 DIAGNOSIS — M47.816 LUMBAR SPONDYLOSIS: ICD-10-CM

## 2019-02-15 DIAGNOSIS — M25.859 HIP IMPINGEMENT SYNDROME, UNSPECIFIED LATERALITY: ICD-10-CM

## 2019-02-15 DIAGNOSIS — M17.10 ARTHRITIS OF KNEE: ICD-10-CM

## 2019-02-15 DIAGNOSIS — M25.569 KNEE PAIN, UNSPECIFIED CHRONICITY, UNSPECIFIED LATERALITY: ICD-10-CM

## 2019-02-15 PROCEDURE — 99213 OFFICE O/P EST LOW 20 MIN: CPT | Performed by: PHYSICAL MEDICINE & REHABILITATION

## 2019-02-15 ASSESSMENT — PATIENT HEALTH QUESTIONNAIRE - PHQ9: CLINICAL INTERPRETATION OF PHQ2 SCORE: 0

## 2019-02-15 NOTE — PROGRESS NOTES
Subjective:      Roshan Bailey presents with Follow-Up        Subjective: Mr. Bailey returns to the office today for follow-up evaluation of spinal/joints/musculoskeletal pain.    Since I last saw him, the patient notes flare of left knee area pain, possibly associated with some activities.  He notes intermittent swelling, notes pain is limiting his ability to function, including walking tolerance.  He notes intermittent crepitus, no instability symptoms     He notes intermittent left hip area pain, merrily activity associated    The patient notes lumbosacral pain is improved since  bilateral lumbar 3, 4, and 5 medial branch radiofrequency neurolysis on 8/21/2018.  The patient now notes residual pain in the left lumbosacral region, without overt radicular component.      The patient has had prior treatment with medications. He has been to physical therapy. No acute changes with bowel/bladder noted. No acute changes with strength noted. He is making an effort with home exercise program. The ongoing pain limits his ability to function. The patient is inquiring about further treatment options.      MEDICAL RECORDS REVIEW/DATA REVIEW: Reviewed in epic.    I reviewed medications. Tried NSAIDs. Desires to limit/avoid oral medications.    I reviewed diagnostic studies:     I reviewed radiographs.    Reviewed MRI of left knee 3/2018. Reviewed left knee x-rays 6/2017.    Reviewed MRI arthrogram left hip 2/2019, I reviewed images and report, showed small anterior superior labral tear, impingement findings, mild bursitis    Reviewed left hip x-rays 11/2018 showed mild hip arthritis, findings suggestive of impingement.    Reviewed MRI lumbar spine 12/2017. Reviewed lumbar spine x-ray 11/2018, I reviewed images and report, showed trace listhesis/instability on flexion-extension at L4-5 and L3-4, not reported on radiologist report    I reviewed lab studies.  Reviewed labs 5/2018, including CMP, CBC, TFTs, A1c 5.8.    I  reviewed medical issues.  Followed by primary care provider.    I reviewed family history: No neuromuscular disorders noted.    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 No pertinent past medical history.    PAST SURGICAL HISTORY:  History reviewed. No pertinent surgical history.    ALLERGIES:  Patient has no known allergies.    MEDICATIONS:    Outpatient Encounter Prescriptions as of 2/15/2019   Medication Sig Dispense Refill   • fluticasone (FLONASE) 50 MCG/ACT nasal spray Spray 1 Spray in nose every day. 1 Bottle 0   • acetaminophen (TYLENOL) 500 MG Tab Take 500-1,000 mg by mouth every 6 hours as needed.     • benzonatate (TESSALON) 200 MG capsule Take 1 Cap by mouth 3 times a day as needed for Cough. (Patient not taking: Reported on 2/15/2019) 45 Cap 0   • prednisoLONE acetate (PRED FORTE) 1 % Suspension Place 1 Drop in both eyes 4 times a day.     • ibuprofen (MOTRIN) 200 MG Tab Take 200 mg by mouth every 6 hours as needed.     • cyclobenzaprine (FLEXERIL) 10 MG Tab Take 1/2 to 1 tablet by mouth once per day as needed for muscle spasm (Patient not taking: Reported on 12/20/2018) 15 Tab 0     No facility-administered encounter medications on file as of 2/15/2019.        SOCIAL HISTORY:    Social History     Social History   • Marital status:      Spouse name: N/A   • Number of children: N/A   • Years of education: N/A     Social History Main Topics   • Smoking status: Never Smoker   • Smokeless tobacco: Never Used   • Alcohol use 0.6 oz/week     1 Cans of beer per week   • Drug use: No   • Sexual activity: Not on file     Other Topics Concern   •  Service Yes   • Blood Transfusions No   • Caffeine Concern No   • Occupational Exposure No   • Hobby Hazards No   • Sleep Concern Yes   • Stress Concern No   • Weight Concern  "No   • Special Diet No   • Back Care Yes   • Exercise Yes   • Bike Helmet Yes   • Seat Belt Yes   • Self-Exams No     Social History Narrative   • No narrative on file       Review of Systems Reviewed, no changes noted  Constitutional: Negative for chills and fever.   HENT: Negative for ear discharge and ear pain.    Eyes: Negative for pain and discharge.   Respiratory: Negative for hemoptysis and sputum production.    Cardiovascular: Negative for orthopnea and claudication.   Gastrointestinal: Negative for constipation and diarrhea.   Genitourinary: Negative for frequency and hematuria.   Musculoskeletal: Positive for back pain, joint pain and myalgias.   Skin: Negative.    Endo/Heme/Allergies: Negative.    All other systems reviewed and are negative.        Objective:     /64   Pulse 86   Temp 36.4 °C (97.5 °F) (Temporal)   Ht 1.803 m (5' 11\")   Wt 99.3 kg (218 lb 14.7 oz)   SpO2 95%   BMI 30.53 kg/m²      Physical exam:  Constitutional: Awake, alert, no acute distress  HEENT: Normocephalic atraumatic, neck supple, no JVD noted,  no meningeal signs noted  Lymphadenopathy: no cervical, supraclavicular, or inguinal lymphadenopathy noted  Cardiovascular: Intact distal pulses, including at ankles, no limb swelling noted  Pulmonary: No tachypnea noted, no accessory muscle use noted, no dyspnea noted  Abdominal: Soft, nontender, exhibits no distension, no peritoneal signs, no HSM  Musculoskeletal:   Lumbar: Only mild tenderness, only mild pain with range of motion testing, negative straight leg testing  Hip: Only mild tenderness, only mild pain with range of motion testing  Knee: Tender with palpation about left knee, including along joint line, crepitus noted, pain with patellar compression, no signs of infection, no significant effusion noted, stable with stress testing  Neurological: oriented. Cranial nerves grossly intact, normal strength, non-focal.  Normal tone.  Sensation intact distally. Reflexes " 1-2+ in  lower limbs, Gait mildly antalgic, steady, reciprocal.   Skin: Skin is intact. no rashes or lesions noted  Psychiatric: normal mood and affect. speech is normal and behavior is normal.         Assessment/Plan:       ASSESSMENT:    1.  Persistent left knee pain, sprain strain, arthritis, patellar cartilage degeneration, effusion    - Reviewed injection therapy with left knee intra-articular joint injection, submit authorization request    2.  Left hip pain, sprain strain, labral tear, impingement, bursitis/tendinopathy, relatively controlled    3.  Lumbosacral pain, myofascial pain, disc bulge, annular tear, foraminal stenosis, degenerative disc disease, mild instability, lumbar spondylosis, lumbar facet syndrome, relatively controlled      DISCUSSION/PLAN:    - I discussed management options. I reviewed symptomatic care    - I reviewed home exercise program    - The patient can consider complementary trials with acupuncture, massage therapy, or TENS unit    - I reviewed medication monitoring.  The patient can consider trial with Lidoderm patch or over-the-counter equivalent for residual neuropathic pain.  I reviewed risks, side effects, and interactions of medications, including over-the-counter medications I reviewed further symptomatic medications.     - I reviewed additional diagnostic options, including further/advanced imaging, electrodiagnostic testing, and further lab screen    - I reviewed additional therapeutic options, including further injection/interventional therapy and additional consultative input     - Return, when authorized, for the above-noted injection, or an as-needed basis      Please note that this dictation was created using voice recognition software. I have made every reasonable attempt to correct obvious errors but there may be errors of grammar and content that I may have overlooked prior to finalization of this note.

## 2019-02-21 ENCOUNTER — TELEPHONE (OUTPATIENT)
Dept: PHYSICAL MEDICINE AND REHAB | Facility: MEDICAL CENTER | Age: 34
End: 2019-02-21

## 2019-02-21 NOTE — TELEPHONE ENCOUNTER
I let Roshan know rick is in place for office injections and offered sooner appt than 3/1/19. He said he would think about it and let me know.

## 2019-02-28 ENCOUNTER — OFFICE VISIT (OUTPATIENT)
Dept: PHYSICAL MEDICINE AND REHAB | Facility: MEDICAL CENTER | Age: 34
End: 2019-02-28
Payer: OTHER GOVERNMENT

## 2019-02-28 VITALS
BODY MASS INDEX: 30.31 KG/M2 | TEMPERATURE: 97.2 F | HEART RATE: 70 BPM | HEIGHT: 71 IN | SYSTOLIC BLOOD PRESSURE: 112 MMHG | OXYGEN SATURATION: 96 % | DIASTOLIC BLOOD PRESSURE: 64 MMHG | WEIGHT: 216.49 LBS

## 2019-02-28 DIAGNOSIS — M25.562 LEFT KNEE PAIN, UNSPECIFIED CHRONICITY: ICD-10-CM

## 2019-02-28 DIAGNOSIS — M17.10 ARTHRITIS OF KNEE: ICD-10-CM

## 2019-02-28 PROCEDURE — 20610 DRAIN/INJ JOINT/BURSA W/O US: CPT | Performed by: PHYSICAL MEDICINE & REHABILITATION

## 2019-02-28 RX ORDER — METHYLPREDNISOLONE ACETATE 40 MG/ML
40 INJECTION, SUSPENSION INTRA-ARTICULAR; INTRALESIONAL; INTRAMUSCULAR; SOFT TISSUE ONCE
Status: COMPLETED | OUTPATIENT
Start: 2019-02-28 | End: 2019-02-28

## 2019-02-28 RX ADMIN — METHYLPREDNISOLONE ACETATE 40 MG: 40 INJECTION, SUSPENSION INTRA-ARTICULAR; INTRALESIONAL; INTRAMUSCULAR; SOFT TISSUE at 10:24

## 2019-02-28 ASSESSMENT — PATIENT HEALTH QUESTIONNAIRE - PHQ9: CLINICAL INTERPRETATION OF PHQ2 SCORE: 0

## 2019-02-28 NOTE — PROGRESS NOTES
Procedure Note:    Date of service: 2/28/2019    Interval history: The patient notes ongoing left knee area pain, worse with activities.  He has tried conservative care.  He is here today to consider injection therapy.    I reviewed intercurrent medical issues.    Reviewed records in [facility], including past medical history, past surgical history, medications, allergies, diagnostic studies, social history.    Diagnosis: Left knee arthritis    Procedure: Left knee intra-articular joint injection    Note: Written/informed consent was obtained, risks benefits alternatives discussed, and all questions were answered.  The patient was placed supine on the exam table and the left knee was placed in slight flexion.  An anterior lateral approach was used, the area was marked, then sterilely prepared.  Point-of-care ultrasound was used to assist with needle placement.  Following local skin anesthesia using a 22-gauge needle, left knee intra-articular joint injection was performed with injection of 4 cc of a mixture of 1 cc of Depo-Medrol 40 mg/cc and 3 cc of 1% lidocaine.  The patient tolerated the procedure well. There were no complications. Post procedure instructions were reviewed with the patient.    I reviewed exercise/rehabilitation program.  See 2/15/2019 progress note for further recommendations. Follow-up in 2 weeks or sooner if needed      Please note that this dictation was created using voice recognition software. I have made every reasonable attempt to correct obvious errors but there may be errors of grammar and content that I may have overlooked prior to finalization of this note.

## 2019-03-01 ENCOUNTER — APPOINTMENT (OUTPATIENT)
Dept: PHYSICAL MEDICINE AND REHAB | Facility: MEDICAL CENTER | Age: 34
End: 2019-03-01
Payer: OTHER GOVERNMENT

## 2019-03-11 ENCOUNTER — OFFICE VISIT (OUTPATIENT)
Dept: PHYSICAL MEDICINE AND REHAB | Facility: MEDICAL CENTER | Age: 34
End: 2019-03-11
Payer: OTHER GOVERNMENT

## 2019-03-11 VITALS
BODY MASS INDEX: 30.77 KG/M2 | TEMPERATURE: 97.9 F | SYSTOLIC BLOOD PRESSURE: 118 MMHG | DIASTOLIC BLOOD PRESSURE: 68 MMHG | HEART RATE: 72 BPM | WEIGHT: 219.8 LBS | OXYGEN SATURATION: 94 % | HEIGHT: 71 IN

## 2019-03-11 DIAGNOSIS — M25.859 HIP IMPINGEMENT SYNDROME, UNSPECIFIED LATERALITY: ICD-10-CM

## 2019-03-11 DIAGNOSIS — M25.562 LEFT KNEE PAIN, UNSPECIFIED CHRONICITY: ICD-10-CM

## 2019-03-11 DIAGNOSIS — M47.816 LUMBAR SPONDYLOSIS: ICD-10-CM

## 2019-03-11 DIAGNOSIS — M25.552 LEFT HIP PAIN: ICD-10-CM

## 2019-03-11 DIAGNOSIS — M54.50 LUMBOSACRAL PAIN: ICD-10-CM

## 2019-03-11 DIAGNOSIS — M79.18 MYOFASCIAL PAIN: ICD-10-CM

## 2019-03-11 PROCEDURE — 99213 OFFICE O/P EST LOW 20 MIN: CPT | Performed by: PHYSICAL MEDICINE & REHABILITATION

## 2019-03-11 ASSESSMENT — PATIENT HEALTH QUESTIONNAIRE - PHQ9: CLINICAL INTERPRETATION OF PHQ2 SCORE: 0

## 2019-03-11 NOTE — PROGRESS NOTES
Subjective:      Roshan Bailey presents with Follow-Up        Subjective: Mr. Bailey returns to the office today for follow-up evaluation of spinal/joints/musculoskeletal pain.    The patient notes a benefit with the left knee intra-articular joint injection in 2/2019, denies complications.    The patient now notes left knee pain is relatively controlled, he has been able to advance his home exercise program    The patient now notes pain most prominent about the left hip/posterior pelvic region, worse with activities, limiting advancing physical activities.  He is inquiring about additional nonsurgical treatment options     He notes lumbosacral pain, left greater than right, primarily activity associated, without radicular component.  The patient had benefit with bilateral lumbar 3, 4, and 5 medial branch radiofrequency neurolysis on 8/21/2018.      The patient has had prior treatment with medications. He has been to physical therapy. No acute changes with bowel/bladder noted. No acute changes with strength noted. He is making an effort with home exercise program. The ongoing pain limits his ability to function. The patient is inquiring about further nonsurgical treatment options.      MEDICAL RECORDS REVIEW/DATA REVIEW: Reviewed in epic.    I reviewed medications. Tried NSAIDs. Desires to limit/avoid oral medications.    I reviewed diagnostic studies:     I reviewed radiographs.    Reviewed MRI arthrogram left hip 2/2019, showed small anterior superior labral tear, impingement findings, mild bursitis    Reviewed left hip x-rays 11/2018 showed mild hip arthritis, findings suggestive of impingement.    Reviewed MRI lumbar spine 12/2017. Reviewed lumbar spine x-ray 11/2018, I reviewed images and report, showed trace listhesis/instability on flexion-extension at L4-5 and L3-4, not reported on radiologist report    Reviewed MRI of left knee 3/2018. Reviewed left knee x-rays 6/2017.    I reviewed lab studies.   Reviewed labs 5/2018, including CMP, CBC, TFTs, A1c 5.8.    I reviewed medical issues.  Followed by primary care provider.    I reviewed family history: No neuromuscular disorders noted.    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 No pertinent past medical history.    PAST SURGICAL HISTORY:  History reviewed. No pertinent surgical history.    ALLERGIES:  Patient has no known allergies.    MEDICATIONS:    Outpatient Encounter Prescriptions as of 3/11/2019   Medication Sig Dispense Refill   • fluticasone (FLONASE) 50 MCG/ACT nasal spray Spray 1 Spray in nose every day. 1 Bottle 0   • ibuprofen (MOTRIN) 200 MG Tab Take 200 mg by mouth every 6 hours as needed.     • cyclobenzaprine (FLEXERIL) 10 MG Tab Take 1/2 to 1 tablet by mouth once per day as needed for muscle spasm 15 Tab 0   • acetaminophen (TYLENOL) 500 MG Tab Take 500-1,000 mg by mouth every 6 hours as needed.     • benzonatate (TESSALON) 200 MG capsule Take 1 Cap by mouth 3 times a day as needed for Cough. (Patient not taking: Reported on 3/11/2019) 45 Cap 0   • prednisoLONE acetate (PRED FORTE) 1 % Suspension Place 1 Drop in both eyes 4 times a day.       No facility-administered encounter medications on file as of 3/11/2019.        SOCIAL HISTORY:    Social History     Social History   • Marital status:      Spouse name: N/A   • Number of children: N/A   • Years of education: N/A     Social History Main Topics   • Smoking status: Never Smoker   • Smokeless tobacco: Never Used   • Alcohol use 0.6 oz/week     1 Cans of beer per week   • Drug use: No   • Sexual activity: Not on file     Other Topics Concern   •  Service Yes   • Blood Transfusions No   • Caffeine Concern No   • Occupational Exposure No   • Hobby Hazards No   • Sleep Concern Yes   • Stress  "Concern No   • Weight Concern No   • Special Diet No   • Back Care Yes   • Exercise Yes   • Bike Helmet Yes   • Seat Belt Yes   • Self-Exams No     Social History Narrative   • No narrative on file       Review of Systems Reviewed, no changes noted  Constitutional: Negative for chills and fever.   HENT: Negative for ear discharge and ear pain.    Eyes: Negative for pain and discharge.   Respiratory: Negative for hemoptysis and sputum production.    Cardiovascular: Negative for orthopnea and claudication.   Gastrointestinal: Negative for constipation and diarrhea.   Genitourinary: Negative for frequency and hematuria.   Musculoskeletal: Positive for back pain, joint pain and myalgias.   Skin: Negative.    Endo/Heme/Allergies: Negative.    All other systems reviewed and are negative.        Objective:     /68   Pulse 72   Temp 36.6 °C (97.9 °F) (Temporal)   Ht 1.803 m (5' 11\")   Wt 99.7 kg (219 lb 12.8 oz)   SpO2 94%   BMI 30.66 kg/m²      Physical exam:  Constitutional: Awake, alert, no acute distress  HEENT: Normocephalic atraumatic, neck supple, no JVD noted,  no meningeal signs noted  Lymphadenopathy: no cervical, supraclavicular, or inguinal lymphadenopathy noted  Cardiovascular: Intact distal pulses, including at ankles, no limb swelling noted  Pulmonary: No tachypnea noted, no accessory muscle use noted, no dyspnea noted  Abdominal: Soft, nontender, exhibits no distension, no peritoneal signs, no HSM  Musculoskeletal:   Lumbosacral: Mild tenderness palpation left greater than right lumbosacral region, mild pain with range of motion testing, negative straight leg testing, mild tenderness in sacroiliac joint region  Hip: Tender lateral aspect, mild decreased range of motion, pain with range of motion testing, reproducing pain  Knee: Minimal tenderness, minimal pain with range of motion testing, no signs of infection, no significant effusion noted, stable with stress testing  Neurological: oriented. " Cranial nerves grossly intact, normal strength, non-focal.  Normal tone.  Sensation intact distally. Reflexes 1-2+ in lower limbs, Gait mildly antalgic, steady, reciprocal.   Skin: Skin is intact. no rashes or lesions noted  Psychiatric: normal mood and affect. speech is normal and behavior is normal.         Assessment/Plan:       ASSESSMENT:    1.  Persistent left hip area pain, sprain strain, labral tear, tendinopathy, suspect impinging    - Reviewed injection therapy with left hip intra-articular joint injection under fluoroscopic guidance as diagnostic/therapeutic intervention    2.  Left knee pain, sprain strain, arthritis, patellar cartilage degeneration, relatively symptomatically controlled following knee joint injection 2/2019    3.  Lumbosacral pain, myofascial pain, disc bulge, annular tear, foraminal stenosis, degenerative disc disease, mild instability, lumbar spondylosis, lumbar facet syndrome, relatively controlled      DISCUSSION/PLAN:    - I discussed management options. I reviewed symptomatic care    - I reviewed home exercise program    - The patient can consider complementary trials with acupuncture, massage therapy, or TENS unit    - I reviewed medication monitoring.  The patient can consider trial with Lidoderm patch or over-the-counter equivalent for residual neuropathic pain.  I reviewed risks, side effects, and interactions of medications, including over-the-counter medications I reviewed further symptomatic medications.     - I reviewed additional diagnostic options, including further/advanced imaging, electrodiagnostic testing, and further lab screen    - I reviewed additional therapeutic options, including further injection/interventional therapy and additional consultative input     - I will plan on seeing the patient back in the procedure center for the above-noted procedure or in the office where we can further review management options      Please note that this dictation was created  using voice recognition software. I have made every reasonable attempt to correct obvious errors but there may be errors of grammar and content that I may have overlooked prior to finalization of this note.

## 2019-03-11 NOTE — PROGRESS NOTES
Special Procedures H&P:    Subjective: Mr. Bailey  notes ongoing pain most prominent about the left hip/posterior pelvic region, worse with activities, limiting advancing physical activities. The patient has had prior treatment with medications. He has been to physical therapy. No acute changes with bowel/bladder noted. No acute changes with strength noted. He is making an effort with home exercise program. The ongoing pain limits his ability to function. The patient is inquiring about further nonsurgical treatment options.     MEDICAL RECORDS REVIEW/DATA REVIEW: Reviewed in epic.     I reviewed medications. Tried NSAIDs.      I reviewed diagnostic studies:      I reviewed radiographs.     Reviewed MRI arthrogram left hip 2/2019, showed small anterior superior labral tear, impingement findings, mild bursitis     Reviewed left hip x-rays 11/2018 showed mild hip arthritis, findings suggestive of impingement.      I reviewed lab studies.  Reviewed labs 5/2018, including CMP, CBC, TFTs, A1C 5.8.     I reviewed medical issues.  Followed by primary care provider.     I reviewed family history: No neuromuscular disorders noted.     I reviewed social issues.       PAST MEDICAL HISTORY:   Past Medical History   History reviewed. No pertinent past medical history.        PAST SURGICAL HISTORY:    Past Surgical History   History reviewed. No pertinent surgical history.        ALLERGIES:  Patient has no known allergies.     MEDICATIONS:    Encounter Medications          Outpatient Encounter Prescriptions as of 3/11/2019   Medication Sig Dispense Refill   • fluticasone (FLONASE) 50 MCG/ACT nasal spray Spray 1 Spray in nose every day. 1 Bottle 0   • ibuprofen (MOTRIN) 200 MG Tab Take 200 mg by mouth every 6 hours as needed.       • cyclobenzaprine (FLEXERIL) 10 MG Tab Take 1/2 to 1 tablet by mouth once per day as needed for muscle spasm 15 Tab 0   • acetaminophen (TYLENOL) 500 MG Tab Take 500-1,000 mg by mouth every 6 hours  "as needed.       • benzonatate (TESSALON) 200 MG capsule Take 1 Cap by mouth 3 times a day as needed for Cough. (Patient not taking: Reported on 3/11/2019) 45 Cap 0   • prednisoLONE acetate (PRED FORTE) 1 % Suspension Place 1 Drop in both eyes 4 times a day.          No facility-administered encounter medications on file as of 3/11/2019.             SOCIAL HISTORY:    Social History               Social History   • Marital status:        Spouse name: N/A   • Number of children: N/A   • Years of education: N/A            Social History Main Topics   • Smoking status: Never Smoker   • Smokeless tobacco: Never Used   • Alcohol use 0.6 oz/week       1 Cans of beer per week   • Drug use: No   • Sexual activity: Not on file           Other Topics Concern   •  Service Yes   • Blood Transfusions No   • Caffeine Concern No   • Occupational Exposure No   • Hobby Hazards No   • Sleep Concern Yes   • Stress Concern No   • Weight Concern No   • Special Diet No   • Back Care Yes   • Exercise Yes   • Bike Helmet Yes   • Seat Belt Yes   • Self-Exams No          Social History Narrative   • No narrative on file            Review of Systems Reviewed, no changes noted  Constitutional: Negative for chills and fever.   HENT: Negative for ear discharge and ear pain.    Eyes: Negative for pain and discharge.   Respiratory: Negative for hemoptysis and sputum production.    Cardiovascular: Negative for orthopnea and claudication.   Gastrointestinal: Negative for constipation and diarrhea.   Genitourinary: Negative for frequency and hematuria.   Musculoskeletal: Positive for back pain, joint pain and myalgias.   Skin: Negative.    Endo/Heme/Allergies: Negative.    All other systems reviewed and are negative.         Objective:      /68   Pulse 72   Temp 36.6 °C (97.9 °F) (Temporal)   Ht 1.803 m (5' 11\")   Wt 99.7 kg (219 lb 12.8 oz)   SpO2 94%   BMI 30.66 kg/m²       Physical exam:  Constitutional: Awake, alert, no " acute distress  HEENT: Normocephalic atraumatic, neck supple, no JVD noted,  no meningeal signs noted  Lymphadenopathy: no cervical, supraclavicular, or inguinal lymphadenopathy noted  Cardiovascular: Intact distal pulses, including at ankles, no limb swelling noted  Pulmonary: No tachypnea noted, no accessory muscle use noted, no dyspnea noted  Abdominal: Soft, nontender, exhibits no distension, no peritoneal signs, no HSM  Musculoskeletal:   Lumbosacral: Mild tenderness palpation left greater than right lumbosacral region, mild pain with range of motion testing, negative straight leg testing, mild tenderness in sacroiliac joint region  Hip: Tender lateral aspect, mild decreased range of motion, pain with range of motion testing, reproducing pain  Knee: Minimal tenderness, minimal pain with range of motion testing, no signs of infection, no significant effusion noted, stable with stress testing  Neurological: oriented. Cranial nerves grossly intact, normal strength, non-focal.  Normal tone.  Sensation intact distally. Reflexes 1-2+ in lower limbs, Gait mildly antalgic, steady, reciprocal.   Skin: Skin is intact. no rashes or lesions noted  Psychiatric: normal mood and affect. speech is normal and behavior is normal.         Assessment:      Left hip impingement        Plan:      Left hip intra-articular joint injection

## 2019-03-19 ENCOUNTER — HOSPITAL ENCOUNTER (OUTPATIENT)
Dept: RADIOLOGY | Facility: REHABILITATION | Age: 34
End: 2019-03-19
Attending: PHYSICAL MEDICINE & REHABILITATION

## 2019-03-19 ENCOUNTER — HOSPITAL ENCOUNTER (OUTPATIENT)
Dept: PAIN MANAGEMENT | Facility: REHABILITATION | Age: 34
End: 2019-03-19
Attending: PHYSICAL MEDICINE & REHABILITATION
Payer: OTHER GOVERNMENT

## 2019-03-19 VITALS
BODY MASS INDEX: 30.46 KG/M2 | RESPIRATION RATE: 16 BRPM | SYSTOLIC BLOOD PRESSURE: 133 MMHG | HEART RATE: 64 BPM | HEIGHT: 71 IN | OXYGEN SATURATION: 94 % | TEMPERATURE: 98.5 F | DIASTOLIC BLOOD PRESSURE: 74 MMHG | WEIGHT: 217.59 LBS

## 2019-03-19 PROCEDURE — 700117 HCHG RX CONTRAST REV CODE 255

## 2019-03-19 PROCEDURE — 700111 HCHG RX REV CODE 636 W/ 250 OVERRIDE (IP)

## 2019-03-19 PROCEDURE — 700101 HCHG RX REV CODE 250

## 2019-03-19 PROCEDURE — 20610 DRAIN/INJ JOINT/BURSA W/O US: CPT

## 2019-03-19 RX ORDER — BUPIVACAINE HYDROCHLORIDE 5 MG/ML
INJECTION, SOLUTION EPIDURAL; INTRACAUDAL
Status: COMPLETED
Start: 2019-03-19 | End: 2019-03-19

## 2019-03-19 RX ORDER — METHYLPREDNISOLONE ACETATE 80 MG/ML
INJECTION, SUSPENSION INTRA-ARTICULAR; INTRALESIONAL; INTRAMUSCULAR; SOFT TISSUE
Status: COMPLETED
Start: 2019-03-19 | End: 2019-03-19

## 2019-03-19 RX ORDER — LIDOCAINE HYDROCHLORIDE 10 MG/ML
INJECTION, SOLUTION EPIDURAL; INFILTRATION; INTRACAUDAL; PERINEURAL
Status: COMPLETED
Start: 2019-03-19 | End: 2019-03-19

## 2019-03-19 RX ORDER — BUPIVACAINE HYDROCHLORIDE 2.5 MG/ML
INJECTION, SOLUTION EPIDURAL; INFILTRATION; INTRACAUDAL
Status: COMPLETED
Start: 2019-03-19 | End: 2019-03-19

## 2019-03-19 RX ADMIN — BUPIVACAINE HYDROCHLORIDE 5 ML: 5 INJECTION, SOLUTION EPIDURAL; INTRACAUDAL; PERINEURAL at 14:45

## 2019-03-19 RX ADMIN — METHYLPREDNISOLONE ACETATE 80 MG: 80 INJECTION, SUSPENSION INTRA-ARTICULAR; INTRALESIONAL; INTRAMUSCULAR; SOFT TISSUE at 14:30

## 2019-03-19 RX ADMIN — LIDOCAINE HYDROCHLORIDE 8 ML: 10 INJECTION, SOLUTION EPIDURAL; INFILTRATION; INTRACAUDAL; PERINEURAL at 14:30

## 2019-03-19 RX ADMIN — IOHEXOL 5 ML: 240 INJECTION, SOLUTION INTRATHECAL; INTRAVASCULAR; INTRAVENOUS; ORAL at 14:30

## 2019-03-19 NOTE — NON-PROVIDER
Current medications reviewed with pt, see medications reconciliation form. Pt raquel taking ASA or other blood thinners or anti-inflammatories.  Pt has a ride post-procedure(wife to drive).  Printed and verbal discharge instructions given to pt who verbalized understanding.

## 2019-03-20 ENCOUNTER — TELEPHONE (OUTPATIENT)
Dept: PHYSICAL MEDICINE AND REHAB | Facility: MEDICAL CENTER | Age: 34
End: 2019-03-20

## 2019-03-20 NOTE — PROCEDURES
DATE OF SERVICE:  03/19/2019    DIAGNOSIS:  Hip impingement.    PROCEDURES:  Left hip intra-articular joint injection.    PROCEDURE NOTE:  Informed consent was obtained.  Risks, benefits, and   alternatives discussed and all questions were answered.  The patient was   placed prone on the fluoroscopy table and the left femoral artery was marked.    The left femoral area was prepared in a sterile manner using povidone-iodine.  The  patient was monitored throughout the procedure.    In the supine position with the left lower limb externally rotated, an   anterior-lateral approach was used.  2 mL of 1% preservative-free lidocaine   was used for local skin anesthesia, lateral to the artery.  Then, using   intermittent fluoroscopy and a 22-gauge spinal needle, the left hip joint was   entered.  Next, a 1-2 mL of Iohexol dye was used to confirm location and   ensure nonvascular flow.  Then, following negative aspiration, 5 mL of mixture  of 1 mL of methylprednisolone 80 mg/mL, 2 mL of 1% preservative-free   lidocaine, and 2 mL of 0.5% preservative-free bupivacaine was injected into   the joint.  A total of 1 spinal needle was used for the hip joint injection.    There were no complications.    The patient was transferred to recovery.  Postinjection instructions were   reviewed with the patient.  Then, the patient was discharged to home in the   care of a friend and/or family member.       ____________________________________     MD J LUIS HINOJOSA / ELLA    DD:  03/19/2019 15:32:57  DT:  03/20/2019 11:45:19    D#:  [id number]  Job#:  [id number]

## 2019-03-20 NOTE — TELEPHONE ENCOUNTER
I left msg to call me back to let me know how he is feeling from procedure and if fv appt works for him.

## 2019-03-21 NOTE — TELEPHONE ENCOUNTER
I left msg to let me know how he is from procedure and if fv appt still works for him.     I also asked him to let me know name of PCP so I can add to his chart.

## 2019-03-25 NOTE — TELEPHONE ENCOUNTER
I spoke with Roshan and he said he is still sore from procedure, but doing better.     He does not have a PCP at this time to update chart. He said he will get one.

## 2019-04-04 ENCOUNTER — OFFICE VISIT (OUTPATIENT)
Dept: PHYSICAL MEDICINE AND REHAB | Facility: MEDICAL CENTER | Age: 34
End: 2019-04-04
Payer: OTHER GOVERNMENT

## 2019-04-04 ENCOUNTER — TELEPHONE (OUTPATIENT)
Dept: SCHEDULING | Facility: IMAGING CENTER | Age: 34
End: 2019-04-04

## 2019-04-04 VITALS
BODY MASS INDEX: 30.65 KG/M2 | HEIGHT: 71 IN | TEMPERATURE: 98.2 F | OXYGEN SATURATION: 98 % | SYSTOLIC BLOOD PRESSURE: 118 MMHG | DIASTOLIC BLOOD PRESSURE: 78 MMHG | HEART RATE: 68 BPM | WEIGHT: 218.92 LBS

## 2019-04-04 DIAGNOSIS — M17.10 ARTHRITIS OF KNEE: ICD-10-CM

## 2019-04-04 DIAGNOSIS — M79.18 MYOFASCIAL PAIN: ICD-10-CM

## 2019-04-04 DIAGNOSIS — M54.50 LUMBOSACRAL PAIN: ICD-10-CM

## 2019-04-04 DIAGNOSIS — Z00.00 HEALTHCARE MAINTENANCE: ICD-10-CM

## 2019-04-04 DIAGNOSIS — M25.559 ARTHRALGIA OF HIP, UNSPECIFIED LATERALITY: ICD-10-CM

## 2019-04-04 DIAGNOSIS — M47.816 LUMBAR SPONDYLOSIS: ICD-10-CM

## 2019-04-04 DIAGNOSIS — M25.859 HIP IMPINGEMENT SYNDROME, UNSPECIFIED LATERALITY: ICD-10-CM

## 2019-04-04 DIAGNOSIS — M25.569 KNEE PAIN, UNSPECIFIED CHRONICITY, UNSPECIFIED LATERALITY: ICD-10-CM

## 2019-04-04 PROCEDURE — 99213 OFFICE O/P EST LOW 20 MIN: CPT | Performed by: PHYSICAL MEDICINE & REHABILITATION

## 2019-04-04 ASSESSMENT — PATIENT HEALTH QUESTIONNAIRE - PHQ9: CLINICAL INTERPRETATION OF PHQ2 SCORE: 0

## 2019-04-04 NOTE — PROGRESS NOTES
Subjective:      Roshan Bailey presents with Follow-Up        Subjective: Mr. Bailey returns to the office today for follow-up evaluation of spinal/joints/musculoskeletal pain.    The patient underwent left hip intra-articular joint injection under fluoroscopic guidance on 3/19/2019.  The patient notes a good response with the injection, denies complications.    At the follow-up visit today, the patient notes the left hip area pain is relatively controlled, primarily activity associated.    The patient notes residual lumbosacral area pain, primarily activity associated, without overt radicular component.  The patient had benefit with bilateral lumbar 3, 4, and 5 medial branch radiofrequency neurolysis on 8/21/2018.      The patient notes left knee area pain is relatively controlled, had benefit with left knee intra-articular joint injection under ultrasound guidance in 2/2019.  He notes residual left knee area pain, primarily activity associated      The patient has had prior treatment with medications. He has been to physical therapy. No acute changes with bowel/bladder noted. No acute changes with strength noted. He is making an effort with home exercise program.  He is pleased by the symptom control to date, inquiring about additional treatment options.      MEDICAL RECORDS REVIEW/DATA REVIEW: Reviewed in epic.    I reviewed medications. Tried NSAIDs. Desires to limit/avoid oral medications.    I reviewed diagnostic studies:     I reviewed radiographs.    Reviewed MRI arthrogram left hip 2/2019, showed small anterior superior labral tear, impingement findings, mild bursitis    Reviewed left hip x-rays 11/2018 showed mild hip arthritis, findings suggestive of impingement.    Reviewed MRI lumbar spine 12/2017. Reviewed lumbar spine x-ray 11/2018, I reviewed images and report, showed trace listhesis/instability on flexion-extension at L4-5 and L3-4, not reported on radiologist report    Reviewed MRI of  left knee 3/2018. Reviewed left knee x-rays 6/2017.    I reviewed lab studies.  Reviewed labs 5/2018, including CMP, CBC, TFTs, A1c 5.8.    I reviewed medical issues.  Followed by primary care provider.    I reviewed family history: No neuromuscular disorders noted.    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 No pertinent past medical history.    PAST SURGICAL HISTORY:  History reviewed. No pertinent surgical history.    ALLERGIES:  Patient has no known allergies.    MEDICATIONS:    Outpatient Encounter Prescriptions as of 4/4/2019   Medication Sig Dispense Refill   • ibuprofen (MOTRIN) 200 MG Tab Take 200 mg by mouth every 6 hours as needed.     • acetaminophen (TYLENOL) 500 MG Tab Take 500-1,000 mg by mouth every 6 hours as needed.       No facility-administered encounter medications on file as of 4/4/2019.        SOCIAL HISTORY:    Social History     Social History   • Marital status:      Spouse name: N/A   • Number of children: N/A   • Years of education: N/A     Social History Main Topics   • Smoking status: Never Smoker   • Smokeless tobacco: Never Used   • Alcohol use 0.6 oz/week     1 Cans of beer per week   • Drug use: No   • Sexual activity: Not on file     Other Topics Concern   •  Service Yes   • Blood Transfusions No   • Caffeine Concern No   • Occupational Exposure No   • Hobby Hazards No   • Sleep Concern Yes   • Stress Concern No   • Weight Concern No   • Special Diet No   • Back Care Yes   • Exercise Yes   • Bike Helmet Yes   • Seat Belt Yes   • Self-Exams No     Social History Narrative   • No narrative on file       Review of Systems Reviewed, no changes noted  Constitutional: Negative for chills and fever.   HENT: Negative for ear discharge and ear pain.    Eyes: Negative for pain and discharge.  "  Respiratory: Negative for hemoptysis and sputum production.    Cardiovascular: Negative for orthopnea and claudication.   Gastrointestinal: Negative for constipation and diarrhea.   Genitourinary: Negative for frequency and hematuria.   Musculoskeletal: Positive for back pain, joint pain and myalgias.   Skin: Negative.    Endo/Heme/Allergies: Negative.    All other systems reviewed and are negative.        Objective:     /78   Pulse 68   Temp 36.8 °C (98.2 °F) (Temporal)   Ht 1.803 m (5' 11\")   Wt 99.3 kg (218 lb 14.7 oz)   SpO2 98%   BMI 30.53 kg/m²      Physical exam:  Constitutional: Awake, alert, no acute distress  HEENT: Normocephalic atraumatic, neck supple, no JVD noted,  no meningeal signs noted  Lymphadenopathy: no cervical, supraclavicular, or inguinal lymphadenopathy noted  Cardiovascular: Intact distal pulses, including at ankles, no limb swelling noted  Pulmonary: No tachypnea noted, no accessory muscle use noted, no dyspnea noted  Abdominal: Soft, nontender, exhibits no distension, no peritoneal signs, no HSM  Musculoskeletal:   Lumbosacral: These have been mild tenderness with palpation lumbosacral region, mild pain with range of motion testing, negative straight leg testing  Hip: Only mild tenderness, only mild pain with range of motion testing  Knee: Minimal tenderness, minimal pain with range of motion testing, no signs of infection, no significant effusion noted, stable with stress testing  Neurological: oriented. Cranial nerves grossly intact, normal strength, non-focal.  Normal tone.  Sensation intact distally. Reflexes 1-2+ in upper and lower limbs, Gait steady, reciprocal.   Skin: Skin is intact. no rashes or lesions noted  Psychiatric: normal mood and affect. speech is normal and behavior is normal.         Assessment/Plan:       ASSESSMENT:    1.  Left hip area pain, sprain strain, labral tear, tendinopathy, hip impingement, overall symptomatically improved following recent " left hip intra-articular joint injection under fluoroscopic guidance    2.  Lumbosacral pain, myofascial pain, disc bulge, annular tear, foraminal stenosis, degenerative disc disease, mild instability, lumbar spondylosis, lumbar facet syndrome, relatively controlled    - Reviewed injection therapy with trigger point injections to treat the residual myofascial component to the ongoing pain, if not responding to more conservative care    3.  Left knee pain, sprain strain,  arthritis, patellar cartilage degeneration, relatively symptomatically controlled following knee joint injection 2/2019    4.  Health maintenance    -Patient notes needs a new primary care provider, submitted referral      DISCUSSION/PLAN:    - I discussed management options. I reviewed symptomatic care    - I reviewed home exercise program.  I reviewed functional restoration program    - The patient can consider complementary trials with acupuncture, massage therapy, or TENS unit    - I reviewed medication monitoring.  The patient can consider trial with Lidoderm patch or over-the-counter equivalent for residual neuropathic pain.  I reviewed risks, side effects, and interactions of medications, including over-the-counter medications I reviewed further symptomatic medications.     - I reviewed additional diagnostic options, including further/advanced imaging, electrodiagnostic testing, and further lab screen    - I reviewed additional therapeutic options, including further injection/interventional therapy and additional consultative input     - Return in 1 month or sooner if needed      Please note that this dictation was created using voice recognition software. I have made every reasonable attempt to correct obvious errors but there may be errors of grammar and content that I may have overlooked prior to finalization of this note.

## 2019-04-23 ENCOUNTER — TELEPHONE (OUTPATIENT)
Dept: MEDICAL GROUP | Facility: LAB | Age: 34
End: 2019-04-23

## 2019-04-23 NOTE — TELEPHONE ENCOUNTER
NEW PATIENT VISIT PRE-VISIT PLANNING    1.  [employer] Patient is checked in Patient Demographics? YES    2.  Immunizations were updated in Epic using WebIZ?: Epic matches WebIZ       •  Web Iz Recommendations: MMR , TDAP and VARICELLA (Chicken Pox)     3.  Is this appointment scheduled as a Hospital Follow-Up? No    4.  Patient is due for the following Health Maintenance Topics:   Health Maintenance Due   Topic Date Due   • IMM DTaP/Tdap/Td Vaccine (1 - Tdap) 08/05/2006           5. Orders for overdue Health Maintenance topics pended in Pre-Charting? NO    6.  Reviewed/Updated the following with patient:   •   Preferred Pharmacy? NO       •   Preferred Lab? NO       •   Preferred Communication? NO       •   Allergies? NO       •   Medications? NO       •   Social History? NO       •   Family History (document living status of immediate family members and if + hx of cancer, diabetes, hypertension, hyperlipidemia, heart attack, stroke) NO    7.  Updated Care Team?       •   DME Company (gait device, O2, CPAP, etc.) NO       •   Other Specialists (eye doctor, derm, GYN, cardiology, endo, etc): NO    8.  Patient was NOT informed to arrive 15 min prior to their   scheduled appointment and bring in their medication bottles.

## 2019-04-30 ENCOUNTER — OFFICE VISIT (OUTPATIENT)
Dept: MEDICAL GROUP | Facility: LAB | Age: 34
End: 2019-04-30
Payer: OTHER GOVERNMENT

## 2019-04-30 ENCOUNTER — TELEPHONE (OUTPATIENT)
Dept: MEDICAL GROUP | Facility: LAB | Age: 34
End: 2019-04-30

## 2019-04-30 VITALS
OXYGEN SATURATION: 96 % | DIASTOLIC BLOOD PRESSURE: 68 MMHG | RESPIRATION RATE: 16 BRPM | SYSTOLIC BLOOD PRESSURE: 120 MMHG | HEART RATE: 72 BPM | WEIGHT: 212.6 LBS | HEIGHT: 71 IN | TEMPERATURE: 97.8 F | BODY MASS INDEX: 29.76 KG/M2

## 2019-04-30 DIAGNOSIS — R06.83 SNORING: ICD-10-CM

## 2019-04-30 DIAGNOSIS — E78.49 OTHER HYPERLIPIDEMIA: ICD-10-CM

## 2019-04-30 DIAGNOSIS — E55.9 VITAMIN D DEFICIENCY: ICD-10-CM

## 2019-04-30 DIAGNOSIS — R19.7 DIARRHEA, UNSPECIFIED TYPE: ICD-10-CM

## 2019-04-30 PROCEDURE — 99214 OFFICE O/P EST MOD 30 MIN: CPT | Performed by: NURSE PRACTITIONER

## 2019-04-30 NOTE — ASSESSMENT & PLAN NOTE
This has been an ongoing problem for over a year.  Patient reports that he has more than 4-5 loose bowel movements every morning between the time he wakes up at 4:30 AM to go to the gym until he finishes at the gym at 9 AM.  He does not have a problem with this later in the day and denies any blood in his stool, fevers, night sweats.  He has been worked up in the past but he reports that his symptoms have gotten worse since then.  He has not had a stool ova parasites done and so we will order that today.

## 2019-04-30 NOTE — ASSESSMENT & PLAN NOTE
There is a new problem for this patient.  He was unaware that his LDL cholesterol was elevated.  He does admit that his diet has been not ideal for some time and he is currently trying to lose weight and improvement dietary changes.  We discussed necessary dietary changes to make to address dyslipidemia.  Patient verbalized understanding.  Did he denies dizziness, generalized weakness/fatigue, vision/hearing changes, jaw pain/paresthesias, BUE pain/paresthesias/numbness/weakness, chest pain/pressure, palpitations, dyspnea, RUQ abdominal pain, oliguria/anuria, BLE edema.    Lab Results   Component Value Date/Time    CHOLSTRLTOT 204 (H) 05/31/2018 08:47 AM     (H) 05/31/2018 08:47 AM    HDL 51 05/31/2018 08:47 AM    TRIGLYCERIDE 66 05/31/2018 08:47 AM

## 2019-04-30 NOTE — ASSESSMENT & PLAN NOTE
Patient presents for evaluation today as he has been told by his wife that he snores loudly and often stops breathing in his sleep and she has to shake him to wake up.  Patient himself reports that he often wakes up panicking as he feels his shortness of breath.  He denies daytime hypersomnolence or morning headaches.  We have discussed moving forward with a overnight pulse oximetry study to see if indeed he is stopping breathing throughout the night.  Patient verbalizes understanding and is agreeable to this prior to referral to this pulmonology for overnight sleep study.

## 2019-04-30 NOTE — PROGRESS NOTES
CC:Diagnoses of Snoring, Diarrhea, unspecified type, Other hyperlipidemia, and Vitamin D deficiency were pertinent to this visit.    HISTORY OF PRESENT ILLNESS: Roshan Bailey is a 33 y.o. male established patient who presents today to discuss the following problems:    Snoring  Patient presents for evaluation today as he has been told by his wife that he snores loudly and often stops breathing in his sleep and she has to shake him to wake up.  Patient himself reports that he often wakes up panicking as he feels his shortness of breath.  He denies daytime hypersomnolence or morning headaches.  We have discussed moving forward with a overnight pulse oximetry study to see if indeed he is stopping breathing throughout the night.  Patient verbalizes understanding and is agreeable to this prior to referral to this pulmonology for overnight sleep study.      Diarrhea  This has been an ongoing problem for over a year.  Patient reports that he has more than 4-5 loose bowel movements every morning between the time he wakes up at 4:30 AM to go to the gym until he finishes at the gym at 9 AM.  He does not have a problem with this later in the day and denies any blood in his stool, fevers, night sweats.  He has been worked up in the past but he reports that his symptoms have gotten worse since then.  He has not had a stool ova parasites done and so we will order that today.    Vitamin D deficiency  Patient had lab work done in 5/2018 with a vitamin D level of 17.  Patient reports that he was unaware of this and has not started supplementing with vitamin D.  We have discussed supplementing his diet with over-the-counter vitamin D3 2000 units daily.  Patient verbalized understanding.  We will recheck in a problem approximately 6 months after he has done supplementation.    Other hyperlipidemia  There is a new problem for this patient.  He was unaware that his LDL cholesterol was elevated.  He does admit that his diet has  been not ideal for some time and he is currently trying to lose weight and improvement dietary changes.  We discussed necessary dietary changes to make to address dyslipidemia.  Patient verbalized understanding.  Did he denies dizziness, generalized weakness/fatigue, vision/hearing changes, jaw pain/paresthesias, BUE pain/paresthesias/numbness/weakness, chest pain/pressure, palpitations, dyspnea, RUQ abdominal pain, oliguria/anuria, BLE edema.    Lab Results   Component Value Date/Time    CHOLSTRLTOT 204 (H) 05/31/2018 08:47 AM     (H) 05/31/2018 08:47 AM    HDL 51 05/31/2018 08:47 AM    TRIGLYCERIDE 66 05/31/2018 08:47 AM           Health Maintenance: Completed    Patient Active Problem List    Diagnosis Date Noted   • Snoring 04/30/2019   • Other hyperlipidemia 04/30/2019   • Vitamin D deficiency 06/01/2018   • Diarrhea 05/22/2018        Allergies:Patient has no known allergies.    Current Outpatient Prescriptions   Medication Sig Dispense Refill   • ibuprofen (MOTRIN) 200 MG Tab Take 200 mg by mouth every 6 hours as needed.     • acetaminophen (TYLENOL) 500 MG Tab Take 500-1,000 mg by mouth every 6 hours as needed.       No current facility-administered medications for this visit.        Social History   Substance Use Topics   • Smoking status: Never Smoker   • Smokeless tobacco: Never Used   • Alcohol use 0.6 oz/week     1 Cans of beer per week     Social History     Social History Narrative   • No narrative on file       Family History   Problem Relation Age of Onset   • Thyroid Mother    • Diabetes Father        ROS:     Constitutional:  Negative for fever, chills, unexpected weight change, and fatigue/generalized weakness.  HEENT:  Negative for headaches, vision changes, hearing changes, ear pain, ear discharge, rhinorrhea, sinus congestion, sore throat, and neck pain.    Respiratory: Negative for cough, sputum production, chest congestion, dyspnea, wheezing, and crackles.    Cardiovascular:Negative  "for chest pain, palpitations, orthopnea, and bilateral lower extremity edema.   Gastrointestinal: Positive for diarrhea per HPI.  Negative for heartburn, nausea, vomiting, abdominal pain, hematochezia, melena, constipation, and greasy/foul-smelling stools.   Genitourinary: Negative for dysuria, polyuria, hematuria, pyuria, urinary urgency, and urinary incontinence.   Musculoskeletal:Negative for myalgias, back pain, and joint pain.   Skin: Negative for rash, itching, cyanotic skin color change.   Neurological: Negative for dizziness, tingling, tremors, focal sensory deficit, focal weakness and headaches.   Endo/Heme/Allergies: Does not bruise/bleed easily.   Psychiatric/Behavioral: Negative for depression, suicidal/homicidal ideation and memory loss.        EXAM:   /68 (BP Location: Right arm, Patient Position: Sitting, BP Cuff Size: Adult)   Pulse 72   Temp 36.6 °C (97.8 °F) (Temporal)   Resp 16   Ht 1.803 m (5' 11\")   Wt 96.4 kg (212 lb 9.6 oz)   SpO2 96%  Body mass index is 29.65 kg/m².    Constitutional: Well-developed and well-nourished. Not diaphoretic. No distress.   Skin: Skin is warm and dry. No rash noted.  Head: Atraumatic without lesions.  Eyes: Conjunctivae and extraocular motions are normal. Pupils are equal, round, and reactive to light. No scleral icterus.   Ears:  External ears unremarkable. Tympanic membranes clear and intact.  Nose: Nares patent. Mucosa without edema or erythema. No discharge. No facial tenderness.  Mouth/Throat: Tongue normal. Oropharynx is clear and moist. Posterior pharynx without erythema or exudates.  Neck: Supple, trachea midline. No thyromegaly present. No cervical or supraclavicular lymphadenopathy.  Cardiovascular: Regular rate and rhythm.   Chest: Effort normal. Clear to auscultation throughout. No adventitious sounds.   Abdomen: Soft, non tender, and without distention. Active bowel sounds in all four quadrants. No rebound, guarding, masses or " hepatosplenomegaly.  Extremities: No cyanosis, clubbing, erythema, nor edema.   Neurological: Alert and oriented x 3. Sensation intact.   Psychiatric:  Behavior, mood, and affect are appropriate.       ASSESSMENT/PLAN:    1. Snoring  New problem.  Abnormal concern breathing patterns noticed by spouse.  - Nocturnal Oximetry Study  - CBC WITH DIFFERENTIAL; Future  - Basic Metabolic Panel; Future    2. Diarrhea, unspecified type  Chronic.  Labs as indicated.  No red flag symptoms.  - TSH WITH REFLEX TO FT4; Future  - CBC WITH DIFFERENTIAL; Future  - Basic Metabolic Panel; Future  - Complete O&P; Future    3. Other hyperlipidemia  This is a new problem.  He was unaware that his LDL was elevated.  Have discussed dietary and exercise interventions.  We recommend decreasing saturated fat which are found in meats that come from a cow or pig, and found in creams, cheeses, butter, warner, and fried foods. We recommend more vegetables, fruits, fish, nuts, olive oil and exercising 5 times a week for at least 30 minutes.    4. Vitamin D deficiency  This is a new problem.  Advised OTC vitamin D3 supplementation 2000 units daily.      Return if symptoms worsen or fail to improve, for Routine Follow up.       Please note that this dictation was created using voice recognition software. I have made every reasonable attempt to correct obvious errors, but I expect that there are errors of grammar and possibly content that I did not discover before finalizing the note.

## 2019-04-30 NOTE — ASSESSMENT & PLAN NOTE
Patient had lab work done in 5/2018 with a vitamin D level of 17.  Patient reports that he was unaware of this and has not started supplementing with vitamin D.  We have discussed supplementing his diet with over-the-counter vitamin D3 2000 units daily.  Patient verbalized understanding.  We will recheck in a problem approximately 6 months after he has done supplementation.

## 2019-04-30 NOTE — TELEPHONE ENCOUNTER
· DME paperwork received from DME to [facility] - over night sleep test requiring provider signature.     · All appropriate fields completed by Medical Assistant: No    · Paperwork handed to provider to sign then faxed to [phone] and scanned into chart

## 2019-05-20 DIAGNOSIS — R29.818 SUSPECTED SLEEP APNEA: ICD-10-CM

## 2019-05-28 ENCOUNTER — HOSPITAL ENCOUNTER (OUTPATIENT)
Dept: LAB | Facility: MEDICAL CENTER | Age: 34
End: 2019-05-28
Attending: NURSE PRACTITIONER
Payer: OTHER GOVERNMENT

## 2019-05-28 ENCOUNTER — OFFICE VISIT (OUTPATIENT)
Dept: PHYSICAL MEDICINE AND REHAB | Facility: MEDICAL CENTER | Age: 34
End: 2019-05-28
Payer: OTHER GOVERNMENT

## 2019-05-28 VITALS
DIASTOLIC BLOOD PRESSURE: 78 MMHG | BODY MASS INDEX: 30.43 KG/M2 | SYSTOLIC BLOOD PRESSURE: 112 MMHG | HEIGHT: 71 IN | OXYGEN SATURATION: 98 % | HEART RATE: 63 BPM | TEMPERATURE: 97.5 F | WEIGHT: 217.37 LBS

## 2019-05-28 DIAGNOSIS — R06.83 SNORING: ICD-10-CM

## 2019-05-28 DIAGNOSIS — M54.50 LUMBOSACRAL PAIN: ICD-10-CM

## 2019-05-28 DIAGNOSIS — M79.18 MYOFASCIAL PAIN: ICD-10-CM

## 2019-05-28 DIAGNOSIS — R19.7 DIARRHEA, UNSPECIFIED TYPE: ICD-10-CM

## 2019-05-28 LAB
BASOPHILS # BLD AUTO: 0.6 % (ref 0–1.8)
BASOPHILS # BLD: 0.05 K/UL (ref 0–0.12)
EOSINOPHIL # BLD AUTO: 0.02 K/UL (ref 0–0.51)
EOSINOPHIL NFR BLD: 0.2 % (ref 0–6.9)
ERYTHROCYTE [DISTWIDTH] IN BLOOD BY AUTOMATED COUNT: 40.5 FL (ref 35.9–50)
HCT VFR BLD AUTO: 44.6 % (ref 42–52)
HGB BLD-MCNC: 15.2 G/DL (ref 14–18)
IMM GRANULOCYTES # BLD AUTO: 0.02 K/UL (ref 0–0.11)
IMM GRANULOCYTES NFR BLD AUTO: 0.2 % (ref 0–0.9)
LYMPHOCYTES # BLD AUTO: 1.34 K/UL (ref 1–4.8)
LYMPHOCYTES NFR BLD: 15.3 % (ref 22–41)
MCH RBC QN AUTO: 29.6 PG (ref 27–33)
MCHC RBC AUTO-ENTMCNC: 34.1 G/DL (ref 33.7–35.3)
MCV RBC AUTO: 86.9 FL (ref 81.4–97.8)
MONOCYTES # BLD AUTO: 0.47 K/UL (ref 0–0.85)
MONOCYTES NFR BLD AUTO: 5.4 % (ref 0–13.4)
NEUTROPHILS # BLD AUTO: 6.86 K/UL (ref 1.82–7.42)
NEUTROPHILS NFR BLD: 78.3 % (ref 44–72)
NRBC # BLD AUTO: 0 K/UL
NRBC BLD-RTO: 0 /100 WBC
PLATELET # BLD AUTO: 223 K/UL (ref 164–446)
PMV BLD AUTO: 12.1 FL (ref 9–12.9)
RBC # BLD AUTO: 5.13 M/UL (ref 4.7–6.1)
WBC # BLD AUTO: 8.8 K/UL (ref 4.8–10.8)

## 2019-05-28 PROCEDURE — 84443 ASSAY THYROID STIM HORMONE: CPT

## 2019-05-28 PROCEDURE — 99212 OFFICE O/P EST SF 10 MIN: CPT | Mod: 25 | Performed by: PHYSICAL MEDICINE & REHABILITATION

## 2019-05-28 PROCEDURE — 20552 NJX 1/MLT TRIGGER POINT 1/2: CPT | Performed by: PHYSICAL MEDICINE & REHABILITATION

## 2019-05-28 PROCEDURE — 80048 BASIC METABOLIC PNL TOTAL CA: CPT

## 2019-05-28 PROCEDURE — 85025 COMPLETE CBC W/AUTO DIFF WBC: CPT

## 2019-05-28 PROCEDURE — 36415 COLL VENOUS BLD VENIPUNCTURE: CPT

## 2019-05-28 RX ORDER — METHYLPREDNISOLONE ACETATE 40 MG/ML
40 INJECTION, SUSPENSION INTRA-ARTICULAR; INTRALESIONAL; INTRAMUSCULAR; SOFT TISSUE ONCE
Status: COMPLETED | OUTPATIENT
Start: 2019-05-28 | End: 2019-05-28

## 2019-05-28 RX ADMIN — METHYLPREDNISOLONE ACETATE 40 MG: 40 INJECTION, SUSPENSION INTRA-ARTICULAR; INTRALESIONAL; INTRAMUSCULAR; SOFT TISSUE at 15:56

## 2019-05-28 ASSESSMENT — PATIENT HEALTH QUESTIONNAIRE - PHQ9: CLINICAL INTERPRETATION OF PHQ2 SCORE: 0

## 2019-05-28 NOTE — PROGRESS NOTES
Subjective:      Roshan Bailey presents with Follow-Up        Subjective: Mr. Bailey returns to the office today for follow-up evaluation of spinal/joints/musculoskeletal pain.    Since I last saw him, the patient notes residual pain in the left lumbosacral region, worse with activities, without overt radicular component.  Overall the patient had benefit with prior bilateral lumbar 3, 4, and 5 medial branch radiofrequency neurolysis on 8/21/2018 for the lumbar facetogenic pain.      The patient notes left hip area pain is relatively controlled, had benefit with left hip intra-articular joint injection under fluoroscopic guidance on 3/19/2019.      The patient notes left in the area pain is relatively controlled, notes intermittent pain flares, primarily activity associated.  The patient had benefit with left knee intra-articular joint injection under ultrasound guidance in 2/2019.       The patient has had prior treatment with medications. He has been to physical therapy. No acute changes with bowel/bladder noted. No acute changes with strength noted. He is making an effort with home exercise program. He returns today to further discuss management options regarding the residual pain.      MEDICAL RECORDS REVIEW/DATA REVIEW: Reviewed in epic.    I reviewed medications. Tried NSAIDs. Desires to limit/avoid oral medications.    I reviewed diagnostic studies:     I reviewed radiographs.     Reviewed MRI lumbar spine 12/2017.     Reviewed lumbar spine x-ray 11/2018, showed trace listhesis/instability on flexion-extension at L4-5 and L3-4, not reported on radiologist report    Reviewed MRI arthrogram left hip 2/2019, showed small anterior superior labral tear, impingement findings, mild bursitis    Reviewed left hip x-rays 11/2018 showed mild hip arthritis, findings suggestive of impingement.    Reviewed MRI of left knee 3/2018. Reviewed left knee x-rays 6/2017.    I reviewed lab studies.  Reviewed labs 5/2018,  including CMP, CBC, TFTs, A1c 5.8.    I reviewed medical issues.  Followed by primary care provider, records reviewed.    I reviewed family history: No neuromuscular disorders noted.    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 No pertinent past medical history.    PAST SURGICAL HISTORY:  History reviewed. No pertinent surgical history.    ALLERGIES:  Patient has no known allergies.    MEDICATIONS:    Outpatient Encounter Prescriptions as of 5/28/2019   Medication Sig Dispense Refill   • ibuprofen (MOTRIN) 200 MG Tab Take 200 mg by mouth every 6 hours as needed.     • acetaminophen (TYLENOL) 500 MG Tab Take 500-1,000 mg by mouth every 6 hours as needed.       Facility-Administered Encounter Medications as of 5/28/2019   Medication Dose Route Frequency Provider Last Rate Last Dose   • methylPREDNISolone acetate (DEPO-MEDROL) injection 40 mg  40 mg Other Once Hermse Dawson M.D.           SOCIAL HISTORY:    Social History     Social History   • Marital status:      Spouse name: N/A   • Number of children: N/A   • Years of education: N/A     Social History Main Topics   • Smoking status: Never Smoker   • Smokeless tobacco: Never Used   • Alcohol use 0.6 oz/week     1 Cans of beer per week   • Drug use: No   • Sexual activity: Not on file     Other Topics Concern   •  Service Yes   • Blood Transfusions No   • Caffeine Concern No   • Occupational Exposure No   • Hobby Hazards No   • Sleep Concern Yes   • Stress Concern No   • Weight Concern No   • Special Diet No   • Back Care Yes   • Exercise Yes   • Bike Helmet Yes   • Seat Belt Yes   • Self-Exams No     Social History Narrative   • No narrative on file       Review of Systems Reviewed, no changes noted  Constitutional: Negative for chills and fever.   HENT: Negative for ear discharge and ear  "pain.    Eyes: Negative for pain and discharge.   Respiratory: Negative for hemoptysis and sputum production.    Cardiovascular: Negative for orthopnea and claudication.   Gastrointestinal: Negative for constipation and diarrhea.   Genitourinary: Negative for frequency and hematuria.   Musculoskeletal: Positive for back pain, joint pain and myalgias.   Skin: Negative.    Endo/Heme/Allergies: Negative.    All other systems reviewed and are negative.        Objective:     /78   Pulse 63   Temp 36.4 °C (97.5 °F) (Temporal)   Ht 1.803 m (5' 11\")   Wt 98.6 kg (217 lb 6 oz)   SpO2 98%   BMI 30.32 kg/m²      Physical exam:  Constitutional: Awake, alert, no acute distress  HEENT: Normocephalic atraumatic, neck supple, no JVD noted,  no meningeal signs noted  Lymphadenopathy: no cervical, supraclavicular, or inguinal lymphadenopathy noted  Cardiovascular: Intact distal pulses, including at ankles, no limb swelling noted  Pulmonary: No tachypnea noted, no accessory muscle use noted, no dyspnea noted  Abdominal: Soft, nontender, exhibits no distension, no peritoneal signs, no HSM  Musculoskeletal:   Lumbosacral: Tender with palpation left lumbosacral region, trigger points noted, negative straight leg testing  Hip: Minimal tenderness, minimal pain with range of motion testing  Neurological: oriented. Cranial nerves grossly intact, normal strength, non-focal.  Normal tone.  Sensation intact distally. Reflexes 1+ in lower limbs, Gait steady, reciprocal.  No upper motor neuron signs evident  Skin: Skin is intact. no rashes or lesions noted  Psychiatric: normal mood and affect. speech is normal and behavior is normal.         Procedure note: Written/informed consent was obtained, risk benefits and alternatives discussed, and all questions were answered.  My medical assistant, Cesar Pelayo, assisted with the procedure.  The patient was placed prone on the exam table and 3 trigger points were identified in the left " lumbosacral paraspinal muscles, left lumbosacral extensor muscle group.  The areas were marked, then sterilely prepared.  Following local skin anesthesia using a 25-gauge needle, trigger point injections were performed with injection of 1 to 2 cc of a mixture of 1 cc of Depo-Medrol 40 mg/cc and 4 cc of 1% lidocaine at each site.  The patient tolerated the procedure well.  There were no complications.          Assessment/Plan:       ASSESSMENT:    1.  Lumbosacral pain, myofascial pain, disc bulge, annular tear, foraminal stenosis, degenerative disc disease, mild instability, lumbar spondylosis, lumbar facet syndrome    - I reviewed post procedure  - Reviewed injection therapy with further trigger point injections to treat the residual myofascial component to the ongoing pain, if not responding to more conservative care    2.  Left hip pain, sprain strain, labral tear, tendinopathy, hip impingement, overall symptomatically improved following left hip intra-articular joint injection under fluoroscopic guidance in 3/2019    3.  Left knee pain, sprain strain,  arthritis, patellar cartilage degeneration, relatively symptomatically controlled following knee joint injection 2/2019    4.  Comorbid medical issues, with care per primary care provider        DISCUSSION/PLAN:    - I discussed management options. I reviewed symptomatic care    - I reviewed home exercise program.      - The patient can consider complementary trials with acupuncture, massage therapy, or TENS unit    - I reviewed medication monitoring.  I reviewed medication adjustments.    - The patient can consider trial with Lidoderm patch or over-the-counter equivalent if no contraindication    - I reviewed risks, side effects, and interactions of medications, including over-the-counter medications I reviewed further symptomatic medications.     - I reviewed additional diagnostic options, including further/advanced imaging, electrodiagnostic testing, and further  lab screen    - I reviewed additional therapeutic options, including further injection/interventional therapy and additional consultative input     - Return in 6 to 8 weeks or an as-needed basis      Please note that this dictation was created using voice recognition software. I have made every reasonable attempt to correct obvious errors but there may be errors of grammar and content that I may have overlooked prior to finalization of this note.

## 2019-05-29 LAB
ANION GAP SERPL CALC-SCNC: 8 MMOL/L (ref 0–11.9)
BUN SERPL-MCNC: 15 MG/DL (ref 8–22)
CALCIUM SERPL-MCNC: 9.5 MG/DL (ref 8.5–10.5)
CHLORIDE SERPL-SCNC: 103 MMOL/L (ref 96–112)
CO2 SERPL-SCNC: 26 MMOL/L (ref 20–33)
CREAT SERPL-MCNC: 1.07 MG/DL (ref 0.5–1.4)
FASTING STATUS PATIENT QL REPORTED: NORMAL
GLUCOSE SERPL-MCNC: 88 MG/DL (ref 65–99)
POTASSIUM SERPL-SCNC: 4.4 MMOL/L (ref 3.6–5.5)
SODIUM SERPL-SCNC: 137 MMOL/L (ref 135–145)
TSH SERPL DL<=0.005 MIU/L-ACNC: 1.4 UIU/ML (ref 0.38–5.33)

## 2019-07-08 DIAGNOSIS — R06.83 SNORING: ICD-10-CM

## 2019-07-10 ENCOUNTER — OFFICE VISIT (OUTPATIENT)
Dept: URGENT CARE | Facility: PHYSICIAN GROUP | Age: 34
End: 2019-07-10
Payer: OTHER GOVERNMENT

## 2019-07-10 ENCOUNTER — HOSPITAL ENCOUNTER (OUTPATIENT)
Dept: RADIOLOGY | Facility: MEDICAL CENTER | Age: 34
End: 2019-07-10
Attending: NURSE PRACTITIONER
Payer: OTHER GOVERNMENT

## 2019-07-10 VITALS
HEIGHT: 71 IN | TEMPERATURE: 97.7 F | WEIGHT: 207 LBS | RESPIRATION RATE: 16 BRPM | DIASTOLIC BLOOD PRESSURE: 78 MMHG | OXYGEN SATURATION: 98 % | HEART RATE: 71 BPM | SYSTOLIC BLOOD PRESSURE: 110 MMHG | BODY MASS INDEX: 28.98 KG/M2

## 2019-07-10 DIAGNOSIS — S99.912A ANKLE INJURY, LEFT, INITIAL ENCOUNTER: ICD-10-CM

## 2019-07-10 DIAGNOSIS — S93.492A SPRAIN OF ANTERIOR TALOFIBULAR LIGAMENT OF LEFT ANKLE, INITIAL ENCOUNTER: ICD-10-CM

## 2019-07-10 PROCEDURE — 73610 X-RAY EXAM OF ANKLE: CPT | Mod: LT

## 2019-07-10 PROCEDURE — 99214 OFFICE O/P EST MOD 30 MIN: CPT | Performed by: NURSE PRACTITIONER

## 2019-07-10 ASSESSMENT — ENCOUNTER SYMPTOMS
SENSORY CHANGE: 0
FEVER: 0
CHILLS: 0
FALLS: 0
TINGLING: 0
WEAKNESS: 0
BRUISES/BLEEDS EASILY: 0
MYALGIAS: 1

## 2019-07-10 NOTE — PROGRESS NOTES
"Subjective:      Roshan Bailey is a 33 y.o. male who presents with Ankle Injury (L ankle popped, tingling, numb)            HPI  Jumping during workout today and heard a \"pop\" in left ankle. Previous injury to left ankle, states \"torn ligament\", no surgery, states this was \"a few years back\". No problems with ankle since today's injury. States plays basketball, very active. Can place pressure on ankle but hurts to walk on it. No NSAID use today after injury.    PMH:  has no past medical history on file.  MEDS:   Current Outpatient Prescriptions:   •  ibuprofen (MOTRIN) 200 MG Tab, Take 200 mg by mouth every 6 hours as needed., Disp: , Rfl:   •  acetaminophen (TYLENOL) 500 MG Tab, Take 500-1,000 mg by mouth every 6 hours as needed., Disp: , Rfl:   ALLERGIES: No Known Allergies  SURGHX: No past surgical history on file.  SOCHX:  reports that he has never smoked. He has never used smokeless tobacco. He reports that he drinks about 0.6 oz of alcohol per week . He reports that he does not use drugs.  FH: Family history was reviewed, no pertinent findings to report    Review of Systems   Constitutional: Negative for chills, fever and malaise/fatigue.   Musculoskeletal: Positive for joint pain and myalgias. Negative for falls.   Skin: Negative for itching and rash.   Neurological: Negative for tingling, sensory change and weakness.   Endo/Heme/Allergies: Does not bruise/bleed easily.   All other systems reviewed and are negative.         Objective:     /78 (BP Location: Left arm, Patient Position: Sitting, BP Cuff Size: Adult)   Pulse 71   Temp 36.5 °C (97.7 °F) (Temporal)   Resp 16   Ht 1.803 m (5' 11\")   Wt 93.9 kg (207 lb)   SpO2 98%   BMI 28.87 kg/m²      Physical Exam   Constitutional: He is oriented to person, place, and time. Vital signs are normal. He appears well-developed and well-nourished. He is active and cooperative.  Non-toxic appearance. He does not have a sickly appearance. He does " not appear ill. No distress.   HENT:   Head: Normocephalic.   Eyes: Pupils are equal, round, and reactive to light. EOM are normal.   Cardiovascular: Normal rate.    Pulmonary/Chest: Effort normal.   Musculoskeletal: He exhibits edema and tenderness. He exhibits no deformity.        Left ankle: He exhibits decreased range of motion and swelling. He exhibits no ecchymosis, no deformity, no laceration and normal pulse. Tenderness. AITFL tenderness found. Achilles tendon normal.        Feet:    Mild swelling of lateral malleolus without redness, bruising or deformity of joint. Able to weight bear, mild favoring of left foot with ambulation. TTP at AITFL region. Decreased ROM with outward passive movement and flexion.   Neurological: He is alert and oriented to person, place, and time.   Skin: Skin is warm and dry. No rash noted. He is not diaphoretic. No erythema.   Vitals reviewed.            MA applied velcro ankle splint  Assessment/Plan:     1. Ankle injury, left, initial encounter    - DX-ANKLE 3+ VIEWS LEFT; Future  - REFERRAL TO SPORTS MEDICINE    2. Sprain of anterior talofibular ligament of left ankle, initial encounter    - DX-ANKLE 3+ VIEWS LEFT; Future  - REFERRAL TO SPORTS MEDICINE    May use NSAID prn for pain/swelling  May use cool compresses for swelling prn  May utilize RICE method prn  Avoid excessive weight bearing to avoid further injury  May apply topical analgesics prn  Perform proper body mechanics with lifting, twisting, bending and walking  Monitor for deformity, numbness/tingling in toes, decreased ROM with weight bearing- need re-evaluation  F/u Sports Med

## 2019-07-17 ENCOUNTER — OFFICE VISIT (OUTPATIENT)
Dept: MEDICAL GROUP | Facility: CLINIC | Age: 34
End: 2019-07-17
Payer: OTHER GOVERNMENT

## 2019-07-17 VITALS
HEART RATE: 64 BPM | BODY MASS INDEX: 28.98 KG/M2 | HEIGHT: 71 IN | RESPIRATION RATE: 14 BRPM | DIASTOLIC BLOOD PRESSURE: 70 MMHG | OXYGEN SATURATION: 99 % | SYSTOLIC BLOOD PRESSURE: 110 MMHG | WEIGHT: 207 LBS | TEMPERATURE: 98.4 F

## 2019-07-17 DIAGNOSIS — S86.312A STRAIN OF PERONEAL TENDON OF LEFT FOOT, INITIAL ENCOUNTER: ICD-10-CM

## 2019-07-17 PROCEDURE — 99214 OFFICE O/P EST MOD 30 MIN: CPT | Performed by: FAMILY MEDICINE

## 2019-07-17 NOTE — PROGRESS NOTES
"Subjective:     Roshan Bailey is a 33 y.o. male who presents for Ankle Injury (Onset 7/10/19, jumping while working out, still popping, numbness and tingling sensation. Patient was doing box jumps, jumped up and landed normal. Swollen above ankle.)    HPI  Pt presents for evaluation of acute left ankle pain   Pt was doing box jumps and felt a pop sensation after landing one of the times   Had pain and swelling following the landing  Pain has been present for 1 week   Pain is the same from initial injury   Pain is mainly along the anterior and lateral ankle   Pain is worse with any side to side movements   Having intermittent numbness in the anterior ankle, but none currently    Review of Systems   Constitutional: Negative for fever.   Respiratory: Negative for shortness of breath.    Cardiovascular: Negative for chest pain.   Gastrointestinal: Negative for vomiting.   Skin: Negative for rash.   Neurological: Negative for focal weakness.     PMH: History of prior ankle sprains to this ankle  MEDS:   Current Outpatient Prescriptions:   •  ibuprofen (MOTRIN) 200 MG Tab, Take 200 mg by mouth every 6 hours as needed., Disp: , Rfl:   •  acetaminophen (TYLENOL) 500 MG Tab, Take 500-1,000 mg by mouth every 6 hours as needed., Disp: , Rfl:   ALLERGIES: No Known Allergies  SURGHX: History reviewed. No pertinent surgical history.  SOCHX:  reports that he has never smoked. He has never used smokeless tobacco. He reports that he drinks about 0.6 oz of alcohol per week . He reports that he does not use drugs.  FH: Family history was reviewed, not contributing to acute injury     Objective:   /70 (BP Location: Left arm, Patient Position: Sitting, BP Cuff Size: Adult)   Pulse 64   Temp 36.9 °C (98.4 °F) (Temporal)   Resp 14   Ht 1.803 m (5' 11\")   Wt 93.9 kg (207 lb)   SpO2 99%   BMI 28.87 kg/m²     Physical Exam   Constitutional: He is oriented to person, place, and time. He appears well-developed and " well-nourished. No distress.   HENT:   Head: Normocephalic and atraumatic.   Musculoskeletal:   Left ankle/foot:  Appearance - No bruising, erythema, or deformity appreciated  Palpation - +TTP along the lateral lower leg and posterior to lateral malleolus and lateral midfoot, no tenderness to palpation of medial or lateral malleolus  ROM - FROM throughout  Strength - 5/5 throughout  Special testing - Neg squeeze test, neg drawer test  Neurovascular - 2+ dorsalis pedis and posterior tibial.  Sensation intact and equal bilaterally  Gait - antalgic, neutral stance without pes planus   Neurological: He is alert and oriented to person, place, and time.   Skin: Skin is warm and dry. No rash noted. He is not diaphoretic.   Psychiatric: He has a normal mood and affect. His behavior is normal. Judgment and thought content normal.     Assessment/Plan:   Assessment    1. Strain of peroneal tendon of left foot, initial encounter  Patient is a 33-year-old male with strain of peroneal tendon.  Ankle x-ray within normal limits.  Has great strength and do not suspect any large tearing.  No indication for MRI at this time.  Will refer to physical therapy for rehab and follow-up after a few weeks of PT to monitor his progress.  - REFERRAL TO PHYSICAL THERAPY Reason for Therapy: Eval/Treat/Report

## 2019-07-28 ASSESSMENT — ENCOUNTER SYMPTOMS
FOCAL WEAKNESS: 0
SHORTNESS OF BREATH: 0
VOMITING: 0
FEVER: 0

## 2019-08-20 ENCOUNTER — APPOINTMENT (OUTPATIENT)
Dept: PHYSICAL THERAPY | Facility: REHABILITATION | Age: 34
End: 2019-08-20
Payer: OTHER GOVERNMENT

## 2019-08-27 ENCOUNTER — APPOINTMENT (OUTPATIENT)
Dept: PHYSICAL THERAPY | Facility: REHABILITATION | Age: 34
End: 2019-08-27
Payer: OTHER GOVERNMENT

## 2019-08-30 ENCOUNTER — APPOINTMENT (OUTPATIENT)
Dept: PHYSICAL THERAPY | Facility: REHABILITATION | Age: 34
End: 2019-08-30
Payer: OTHER GOVERNMENT

## 2019-09-03 ENCOUNTER — APPOINTMENT (OUTPATIENT)
Dept: PHYSICAL THERAPY | Facility: REHABILITATION | Age: 34
End: 2019-09-03
Payer: OTHER GOVERNMENT

## 2019-09-05 ENCOUNTER — APPOINTMENT (OUTPATIENT)
Dept: PHYSICAL THERAPY | Facility: REHABILITATION | Age: 34
End: 2019-09-05
Payer: OTHER GOVERNMENT

## 2019-10-17 ENCOUNTER — APPOINTMENT (OUTPATIENT)
Dept: SLEEP MEDICINE | Facility: MEDICAL CENTER | Age: 34
End: 2019-10-17
Payer: COMMERCIAL

## 2020-02-24 ENCOUNTER — OFFICE VISIT (OUTPATIENT)
Dept: URGENT CARE | Facility: PHYSICIAN GROUP | Age: 35
End: 2020-02-24
Payer: COMMERCIAL

## 2020-02-24 VITALS
BODY MASS INDEX: 29.68 KG/M2 | HEART RATE: 61 BPM | RESPIRATION RATE: 18 BRPM | DIASTOLIC BLOOD PRESSURE: 88 MMHG | HEIGHT: 71 IN | SYSTOLIC BLOOD PRESSURE: 130 MMHG | WEIGHT: 212 LBS | OXYGEN SATURATION: 97 % | TEMPERATURE: 97 F

## 2020-02-24 DIAGNOSIS — J98.8 VIRAL RESPIRATORY ILLNESS: ICD-10-CM

## 2020-02-24 DIAGNOSIS — B97.89 VIRAL RESPIRATORY ILLNESS: ICD-10-CM

## 2020-02-24 DIAGNOSIS — J02.9 PHARYNGITIS, UNSPECIFIED ETIOLOGY: ICD-10-CM

## 2020-02-24 LAB
INT CON NEG: NEGATIVE
INT CON POS: POSITIVE
S PYO AG THROAT QL: NEGATIVE

## 2020-02-24 PROCEDURE — 99214 OFFICE O/P EST MOD 30 MIN: CPT | Performed by: NURSE PRACTITIONER

## 2020-02-24 PROCEDURE — 87880 STREP A ASSAY W/OPTIC: CPT | Performed by: NURSE PRACTITIONER

## 2020-02-24 RX ORDER — IBUPROFEN 800 MG/1
800 TABLET ORAL EVERY 8 HOURS PRN
Qty: 30 TAB | Refills: 0 | Status: SHIPPED | OUTPATIENT
Start: 2020-02-24

## 2020-02-24 RX ORDER — DIPHENHYDRAMINE HYDROCHLORIDE AND LIDOCAINE HYDROCHLORIDE AND ALUMINUM HYDROXIDE AND MAGNESIUM HYDRO
5 KIT EVERY 6 HOURS PRN
Qty: 100 ML | Refills: 0 | Status: SHIPPED | OUTPATIENT
Start: 2020-02-24 | End: 2020-02-29

## 2020-02-24 ASSESSMENT — ENCOUNTER SYMPTOMS
SORE THROAT: 1
EYE DISCHARGE: 1
COUGH: 1
SHORTNESS OF BREATH: 0
EYE REDNESS: 1
FEVER: 0
NAUSEA: 1
NEUROLOGICAL NEGATIVE: 1

## 2020-02-24 NOTE — PROGRESS NOTES
Subjective:     Roshan Bailey is a 34 y.o. male who presents for Cough (sore throat, congestion, nasal drip, bodyaches x5days)       Pharyngitis    This is a new problem. Episode onset: 5 days ago. The problem has been gradually worsening. The pain is moderate. Associated symptoms include congestion and coughing. Pertinent negatives include no shortness of breath. Treatments tried: DayQuil, NyQuil. The treatment provided no relief.     Patient has had a flu shot this season.  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    PMH:  has no past medical history on file.    MEDS:   Current Outpatient Medications:   •  DPH-Lido-AlHydr-MgHydr-Simeth (MAGIC MOUTHWASH BLM) Suspension, Take 5 mL by mouth every 6 hours as needed for up to 5 days., Disp: 100 mL, Rfl: 0  •  ibuprofen (MOTRIN) 800 MG Tab, Take 1 Tab by mouth every 8 hours as needed for Moderate Pain., Disp: 30 Tab, Rfl: 0  •  ibuprofen (MOTRIN) 200 MG Tab, Take 200 mg by mouth every 6 hours as needed., Disp: , Rfl:   •  acetaminophen (TYLENOL) 500 MG Tab, Take 500-1,000 mg by mouth every 6 hours as needed., Disp: , Rfl:     ALLERGIES: No Known Allergies    SURGHX: History reviewed. No pertinent surgical history.    SOCHX:  reports that he has never smoked. He has never used smokeless tobacco. He reports current alcohol use of about 0.6 oz of alcohol per week. He reports that he does not use drugs.     FH: Reviewed with patient, not pertinent to this visit.    Review of Systems   Constitutional: Positive for malaise/fatigue. Negative for fever.   HENT: Positive for congestion and sore throat.    Eyes: Positive for discharge and redness.   Respiratory: Positive for cough. Negative for shortness of breath.    Gastrointestinal: Positive for nausea.   Neurological: Negative.    All other systems reviewed and are negative.    Additional details per HPI.    Objective:     /88 (BP Location: Left arm, Patient Position: Sitting, BP Cuff  "Size: Large adult)   Pulse 61   Temp 36.1 °C (97 °F) (Temporal)   Resp 18   Ht 1.803 m (5' 11\")   Wt 96.2 kg (212 lb)   SpO2 97%   BMI 29.57 kg/m²     Physical Exam  Vitals signs reviewed.   Constitutional:       General: He is not in acute distress.     Appearance: He is well-developed. He is not toxic-appearing or diaphoretic.   HENT:      Head: Normocephalic.      Right Ear: Tympanic membrane and external ear normal.      Left Ear: Tympanic membrane and external ear normal.      Nose: Mucosal edema and rhinorrhea present.      Mouth/Throat:      Mouth: Mucous membranes are moist.      Pharynx: Uvula midline. Pharyngeal swelling and posterior oropharyngeal erythema present. No oropharyngeal exudate.   Eyes:      General: Lids are normal.         Right eye: No discharge.         Left eye: No discharge.      Extraocular Movements: Extraocular movements intact.      Conjunctiva/sclera: Conjunctivae normal.      Right eye: Right conjunctiva is not injected.      Left eye: Left conjunctiva is not injected.      Pupils: Pupils are equal, round, and reactive to light.   Neck:      Musculoskeletal: Normal range of motion.   Cardiovascular:      Rate and Rhythm: Normal rate and regular rhythm.      Pulses: Normal pulses.      Heart sounds: Normal heart sounds.   Pulmonary:      Effort: Pulmonary effort is normal. No respiratory distress.      Breath sounds: Normal breath sounds. No decreased breath sounds, wheezing, rhonchi or rales.   Abdominal:      General: Bowel sounds are normal.      Palpations: Abdomen is soft.      Tenderness: There is no abdominal tenderness.   Musculoskeletal: Normal range of motion.         General: No deformity.   Skin:     General: Skin is warm and dry.      Capillary Refill: Capillary refill takes less than 2 seconds.      Coloration: Skin is not pale.   Neurological:      Mental Status: He is alert and oriented to person, place, and time.      Sensory: No sensory deficit.      " Coordination: Coordination normal.   Psychiatric:         Behavior: Behavior normal. Behavior is cooperative.       Rapid Strep A swab: negative       Assessment/Plan:     1. Viral respiratory illness    2. Pharyngitis, unspecified etiology  - POCT Rapid Strep A  - DPH-Lido-AlHydr-MgHydr-Simeth (MAGIC MOUTHWASH BLM) Suspension; Take 5 mL by mouth every 6 hours as needed for up to 5 days.  Dispense: 100 mL; Refill: 0  - ibuprofen (MOTRIN) 800 MG Tab; Take 1 Tab by mouth every 8 hours as needed for Moderate Pain.  Dispense: 30 Tab; Refill: 0    Discussed likely self-limiting viral etiology and expected course and duration of illness.    Discussed close monitoring and supportive measures including increasing fluids and rest as well as OTC symptom management per 's instructions.  Rx as above for symptomatic relief.  Viral symptom Rx provided (discussed nasal saline sprays, decongestants, antihistamines).  Work note provided.    Vital signs stable, afebrile, asymptomatic, no acute distress.    Warning signs reviewed. Return precautions discussed.    Patient advised to: Return for 1) Symptoms don't improve or worsen, or go to ER, 2) Follow up with primary care in 7-10 days.    Differential diagnosis, natural history, supportive care, and indications for immediate follow-up discussed. All questions answered. Patient agrees with the plan of care.

## 2020-02-24 NOTE — LETTER
February 24, 2020         Patient: Roshan Bailey   YOB: 1985   Date of Visit: 2/24/2020           To Whom it May Concern:    Roshan Bailey was seen in my clinic on 2/24/2020 due to illness. The patient may return to work 2/27/2020 or sooner if symptoms are improved and the patient is feeling better.     If you have any questions or concerns, please don't hesitate to call.        Sincerely,           SANDRA Jenkins.  Electronically Signed

## 2020-11-18 ENCOUNTER — HOSPITAL ENCOUNTER (EMERGENCY)
Facility: MEDICAL CENTER | Age: 35
End: 2020-11-18
Attending: EMERGENCY MEDICINE
Payer: COMMERCIAL

## 2020-11-18 ENCOUNTER — APPOINTMENT (OUTPATIENT)
Dept: RADIOLOGY | Facility: MEDICAL CENTER | Age: 35
End: 2020-11-18
Attending: EMERGENCY MEDICINE
Payer: COMMERCIAL

## 2020-11-18 VITALS
BODY MASS INDEX: 28.73 KG/M2 | WEIGHT: 205.25 LBS | HEIGHT: 71 IN | OXYGEN SATURATION: 93 % | SYSTOLIC BLOOD PRESSURE: 109 MMHG | RESPIRATION RATE: 21 BRPM | HEART RATE: 67 BPM | TEMPERATURE: 97.4 F | DIASTOLIC BLOOD PRESSURE: 64 MMHG

## 2020-11-18 DIAGNOSIS — U07.1 COVID-19: ICD-10-CM

## 2020-11-18 LAB — EKG IMPRESSION: NORMAL

## 2020-11-18 PROCEDURE — 93005 ELECTROCARDIOGRAM TRACING: CPT

## 2020-11-18 PROCEDURE — 700111 HCHG RX REV CODE 636 W/ 250 OVERRIDE (IP): Performed by: EMERGENCY MEDICINE

## 2020-11-18 PROCEDURE — 93005 ELECTROCARDIOGRAM TRACING: CPT | Performed by: EMERGENCY MEDICINE

## 2020-11-18 PROCEDURE — 71045 X-RAY EXAM CHEST 1 VIEW: CPT

## 2020-11-18 PROCEDURE — 96372 THER/PROPH/DIAG INJ SC/IM: CPT

## 2020-11-18 PROCEDURE — 99284 EMERGENCY DEPT VISIT MOD MDM: CPT

## 2020-11-18 RX ORDER — METOCLOPRAMIDE HYDROCHLORIDE 5 MG/ML
10 INJECTION INTRAMUSCULAR; INTRAVENOUS ONCE
Status: COMPLETED | OUTPATIENT
Start: 2020-11-18 | End: 2020-11-18

## 2020-11-18 RX ORDER — KETOROLAC TROMETHAMINE 30 MG/ML
30 INJECTION, SOLUTION INTRAMUSCULAR; INTRAVENOUS ONCE
Status: COMPLETED | OUTPATIENT
Start: 2020-11-18 | End: 2020-11-18

## 2020-11-18 RX ORDER — ONDANSETRON 4 MG/1
4 TABLET, ORALLY DISINTEGRATING ORAL ONCE
Status: COMPLETED | OUTPATIENT
Start: 2020-11-18 | End: 2020-11-18

## 2020-11-18 RX ORDER — ONDANSETRON 4 MG/1
4 TABLET, ORALLY DISINTEGRATING ORAL EVERY 6 HOURS PRN
Qty: 10 TAB | Refills: 0 | Status: SHIPPED
Start: 2020-11-18 | End: 2021-03-31

## 2020-11-18 RX ADMIN — ONDANSETRON 4 MG: 4 TABLET, ORALLY DISINTEGRATING ORAL at 12:51

## 2020-11-18 RX ADMIN — METOCLOPRAMIDE 10 MG: 5 INJECTION, SOLUTION INTRAMUSCULAR; INTRAVENOUS at 12:52

## 2020-11-18 RX ADMIN — KETOROLAC TROMETHAMINE 30 MG: 30 INJECTION, SOLUTION INTRAMUSCULAR at 12:55

## 2020-11-18 NOTE — ED TRIAGE NOTES
Chief Complaint   Patient presents with   • Cough     x 1 week   • Shortness of Breath     x 3 days.     Also c/o body aches/chills/diarrhea. Denies any other medical history. VSS. Educated on triage process. Instructed to notify staff for any worsening symptoms. Tested positive for covid last Wednesday. Placed in senior lounge room.

## 2020-11-18 NOTE — ED NOTES
EPR at the bedside, pt's main complaints today are nausea, headache and a worsening cough at night.

## 2020-11-18 NOTE — ED PROVIDER NOTES
ED Provider Note    Scribed for Bennett Quintana M.D. by Lakeisha Mcghee. 11/18/2020  11:17 AM    Primary care provider: No primary care provider on file.  Means of arrival: Walk In  History obtained from: Patient  History limited by: None     CHIEF COMPLAINT  Chief Complaint   Patient presents with   • Cough     x 1 week   • Shortness of Breath     x 3 days.       HPI  Roshan Bailey is a 35 y.o. male who presents to the Emergency Department for shortness of breath onset 3 days ago. The patient states that he first developed a cough over a week ago and received a positive Covid-19 test 7 days ago. After his shortness of breath began to worsen, he was prompted to come into the [facility] ED this morning to have his symptoms further evaluated. Tylenol was taken for alleviation, and shortness of breath is exacerbated by walking. Patient has associated headache, nausea, abdominal pain, body aches, chills, cough, and diarrhea, but denies vomiting or chest pain. He also denies taking any recent travels outside of the country. Patient drinks alcohol, but does not smoke or use drugs. He has no known allergies and does not take any daily medications. Patient does not have a PCP.     REVIEW OF SYSTEMS  Pertinent positives include shortness of breath, headache, nausea, abdominal pain, body aches, chills, cough, and diarrhea. Pertinent negatives include no vomiting or chest pain.  All other systems reviewed and negative.    PAST MEDICAL HISTORY   None noted     SURGICAL HISTORY  patient denies any surgical history    SOCIAL HISTORY  Social History     Tobacco Use   • Smoking status: Never Smoker   • Smokeless tobacco: Never Used   Substance Use Topics   • Alcohol use: Yes     Alcohol/week: 0.6 oz     Types: 1 Cans of beer per week   • Drug use: No      Social History     Substance and Sexual Activity   Drug Use No       FAMILY HISTORY  Family History   Problem Relation Age of Onset   • Thyroid Mother    • Diabetes Father   "      CURRENT MEDICATIONS  Home Medications     Reviewed by Sudha Holt R.N. (Registered Nurse) on 11/18/20 at 0858  Med List Status: Partial   Medication Last Dose Status   acetaminophen (TYLENOL) 500 MG Tab taking Active   ibuprofen (MOTRIN) 200 MG Tab  Active   ibuprofen (MOTRIN) 800 MG Tab  Active   Pseudoeph-Doxylamine-DM-APAP (NYQUIL PO) taking Active                ALLERGIES  No Known Allergies    PHYSICAL EXAM  VITAL SIGNS: /79   Pulse 84   Temp 36.3 °C (97.4 °F) (Temporal)   Resp 16   Ht 1.803 m (5' 11\")   Wt 93.1 kg (205 lb 4 oz)   SpO2 94%   BMI 28.63 kg/m²     Vital signs reviewed.  Constitutional:  Appears well-developed and well-nourished. No distress.   Head: Normocephalic.   Mouth/Throat: Oropharynx is clear and moist.   Eyes: EOM are normal. Pupils are equal, round, and reactive to light.   Neck: Normal range of motion. Neck supple.   Cardiovascular: Normal rate, regular rhythm and normal heart sounds.    Pulmonary/Chest: Effort normal and breath sounds normal. No wheezes.   Abdominal: Mild tenderness in the epigastric region, Soft.There is no rebound and no guarding.   Musculoskeletal: Exhibits no edema.   Extremities: No pitting edema   Lymphadenopathy: No cervical adenopathy.   Neurological: Patient is alert and oriented to person, place, and time. CNs II - XII intact. DTRs intact. Normal sensation and strength.  Skin: Skin is warm and dry. No rash    Psychiatric: Patient has a normal mood and affect. Behavior is normal.     EKG  12 Lead EKG interpreted by me to show sinus rhythm at 73. Normal P waves. Normal QRS. Normal ST segments. Normal T waves. Normal EKG.     LABS  Results for orders placed or performed during the hospital encounter of 11/18/20   EKG (NOW)   Result Value Ref Range    Report       [facility] Emergency Dept.    Test Date:  2020-11-18  Pt Name:    MARAL MOORE               Department: ER  MRN:        [id number]                      " Room:  Gender:     Male                         Technician: [id number]  :        1985                   Requested By:ER TRIAGE PROTOCOL  Order #:    [id number]                    Reading MD: MIGDALIA WILSON MD    Measurements  Intervals                                Axis  Rate:       73                           P:          53  OR:         184                          QRS:        66  QRSD:       94                           T:          18  QT:         396  QTc:        437    Interpretive Statements  SINUS RHYTHM  PROBABLE LEFT ATRIAL ABNORMALITY  PROBABLE LEFT VENTRICULAR HYPERTROPHY  No previous ECG available for comparison  Normal ekg  Electronically Signed On 2020 12:58:44 PST by MIGDALIA WILSON MD       All labs reviewed by me.       RADIOLOGY  DX-CHEST-PORTABLE (1 VIEW)   Final Result      1.  Bilateral parenchymal opacities, left greater than right are consistent with pneumonia. These findings are typical of Covid 19 pneumonia.        The radiologist's interpretation of all radiological studies have been reviewed by me.    COURSE & MEDICAL DECISION MAKING  Pertinent Labs & Imaging studies reviewed. (See chart for details) The patient's [facility] and past medical  records were reviewed    11:17 AM - Patient seen and examined at bedside. Discussed plan of care with patient. I informed them that labs and imaging will be ordered to evaluate symptoms. The patient is understanding and agreeable with plan of care. Patient will be treated with 30 mg Toradol, 10 mg Reglan and 4 mg Zofran. Ordered DX-Chest-Portable (1 View) to evaluate his symptoms. The differential diagnoses include but are not limited to: Pneumonia vs. Viral Syndrome     12:51 PM Patient was reevaluated at bedside. Discussed radiology results with the patient and informed them that there were no acute abnormalities as noted above. The plan of care was discussed with the patient. He is understanding and agreeable to the plan of care.  Patient had the opportunity to ask any questions. The plan for discharge was discussed with the patient and he was told to to return for any new or worsening symptoms and to follow up with his PCP. Patient is understanding and agreeable to the plan for discharge.     PPE Note: I verified that the patient was wearing a mask and I was wearing appropriate PPE every time I entered the room. The patient's mask was on the patient at all times during my encounter except for a brief view of the oropharynx.    The patient will return for new or worsening symptoms and is stable at the time of discharge.    DISPOSITION:  Patient will be discharged home in stable condition.    FOLLOW UP:  [facility], Emergency Dept  [facility] [facility] [address] [phone]  In 2 days  As needed, If symptoms worsen      OUTPATIENT MEDICATIONS:  New Prescriptions    ONDANSETRON (ZOFRAN ODT) 4 MG TABLET DISPERSIBLE    Take 1 Tab by mouth every 6 hours as needed for Nausea.        FINAL IMPRESSION  1. COVID-19          Lakeisha MCCARTY (West), am scribing for, and in the presence of, Bennett Quintana M.D..    Electronically signed by: Lakeisha Mcghee (West), 11/18/2020    Bennett MCCARTY M.D. personally performed the services described in this documentation, as scribed by Lakeisha Mcghee in my presence, and it is both accurate and complete. C    The note accurately reflects work and decisions made by me.  Bennett Quintana M.D.  11/18/2020  3:18 PM

## 2021-03-16 ENCOUNTER — NURSE TRIAGE (OUTPATIENT)
Dept: HEALTH INFORMATION MANAGEMENT | Facility: OTHER | Age: 36
End: 2021-03-16

## 2021-03-16 NOTE — TELEPHONE ENCOUNTER
1. Caller Name: Roshan                 Call Back Number: [phone] (home)   2.   [facility] PCP or Specialty Provider: Yes Establish        2. Has the patient previously tested positive for COVID-19? Yes         Was the patient hospitalized due to COVID-19 or are they being scheduled for PFT? No         Has it been at least 14 days since date of symptom onset or positive test? Yes    Disposition: Confirmed patient appointment. Advised patient to call [phone] if anything changes prior to scheduled appointment.    Chronic nausea and diarrhea    CLEARED FOR IN OFFICE VISIT

## 2021-03-16 NOTE — TELEPHONE ENCOUNTER
Regarding: Next course of action / est care with Antelmo   ----- Message from Shiloh Solorio sent at 3/16/2021  8:29 AM PDT -----  Diarrhea and nausea. Est care with Antelmo in office or vv     NP / PRIOR PCP UNKNOWN / NO PAIN MEDS / EST CARE /

## 2021-03-31 ENCOUNTER — OFFICE VISIT (OUTPATIENT)
Dept: MEDICAL GROUP | Facility: PHYSICIAN GROUP | Age: 36
End: 2021-03-31
Payer: COMMERCIAL

## 2021-03-31 VITALS
WEIGHT: 216.6 LBS | DIASTOLIC BLOOD PRESSURE: 68 MMHG | HEART RATE: 54 BPM | OXYGEN SATURATION: 96 % | RESPIRATION RATE: 12 BRPM | TEMPERATURE: 98.5 F | SYSTOLIC BLOOD PRESSURE: 108 MMHG | BODY MASS INDEX: 30.32 KG/M2 | HEIGHT: 71 IN

## 2021-03-31 DIAGNOSIS — E66.3 OVERWEIGHT: ICD-10-CM

## 2021-03-31 DIAGNOSIS — K52.9 CHRONIC DIARRHEA: ICD-10-CM

## 2021-03-31 PROCEDURE — 99214 OFFICE O/P EST MOD 30 MIN: CPT | Performed by: INTERNAL MEDICINE

## 2021-03-31 ASSESSMENT — PATIENT HEALTH QUESTIONNAIRE - PHQ9: CLINICAL INTERPRETATION OF PHQ2 SCORE: 0

## 2021-03-31 NOTE — PROGRESS NOTES
"CC: new provider  Chronic diarrhea      HPI: This is a 35 y.o. pt.  Pt's medical history is notable for:     Chronic diarrhea  Chronic cond  Sx noted since last couple yrs  Noted slight discomfort associated w nausea  Loose stools approx6-7 per day  Anxiety made it worse  As soon as done w eating> \"gotta go to bathroom\"  No gi bleeding  Had stool tests done in the past : no cause identified.  Takes pepto bismol as needed  Patient reported that his weight has remained fairly stable since last year    Overweight  This is chronic condition.   Pt is aware of elevated BMI.  Brief discussion with the patient regarding diet, exercise, and lifestyle modification to help achieve and maintain healthy weight              REVIEW OF SYSTEMS:     Constitutional:  no fever / chills   Neurologic: no headaches  Eyes: no changes in vision  ENT: no sore throat, no hearing loss  CV:  no chest pain, no palpitations  Pulmonary: no SOB, no cough          Allergies: Patient has no known allergies.    Current Outpatient Medications Ordered in Epic   Medication Sig Dispense Refill   • Pseudoeph-Doxylamine-DM-APAP (NYQUIL PO) Take  by mouth.     • ibuprofen (MOTRIN) 800 MG Tab Take 1 Tab by mouth every 8 hours as needed for Moderate Pain. 30 Tab 0   • ibuprofen (MOTRIN) 200 MG Tab Take 200 mg by mouth every 6 hours as needed.     • acetaminophen (TYLENOL) 500 MG Tab Take 500-1,000 mg by mouth every 6 hours as needed.       No current Epic-ordered facility-administered medications on file.       Past Medical, Social, and Family history reviewed and updated in EPIC     ------------------------------------------------------------------------------     PHYSICAL EXAM:   Vitals:    03/31/21 0734   BP: 108/68   Pulse: (!) 54   Resp: 12   Temp: 36.9 °C (98.5 °F)   SpO2: 96%      Body mass index is 30.21 kg/m².         Constitutional: no acute distress  Neck: supple, no JVD  CV: heart RRR  Resp: normal effort, no wheezing or rales.  GI: abdomen soft, " no obvious mass, no tenderness  Neuro: CN 2-12 grossly intact        -----------------------------------------------------------------------------    ASSESSMENT:   1. Chronic diarrhea  CULTURE STOOL    Complete O&P    CELIAC DISEASE AB PANEL    REFERRAL TO GASTROENTEROLOGY    Basic Metabolic Panel    CBC WITH DIFFERENTIAL    TSH   2. Overweight             MEDICAL DECISION MAKING: DISCUSSION / STATUS / PLAN:    Chronic diarrhea.  Cause unclear.  Needing further investigation.  Lab test today ordered as noted above including celiac panel stool culture ova and parasite  Also refer the patient to GI for further evaluation.  Advised the patient to keep up with fluid intake to avoid dehydration.    Advised the patient to go to ER if symptoms worsen or change.    Overweight advised the patient regarding healthy diet and exercise.     Return in about 4 months (around 7/31/2021).       PATIENT EDUCATION:  -If any problems should arise, patient was advised to contact our office or go to ER to be evaluated.      Please note that this dictation was created using voice recognition software. I have made every reasonable attempt to correct obvious errors, but it is possible there are errors of grammar and possibly content that I did not discover before finalizing the note.

## 2021-03-31 NOTE — ASSESSMENT & PLAN NOTE
"Chronic cond  Sx noted since last couple yrs  Noted slight discomfort associated w nausea  Loose stools approx6-7 per day  Anxiety made it worse  As soon as done w eating> \"gotta go to bathroom\"  No gi bleeding  Had stool tests done in the past : no cause identified.  Takes pepto bismol as needed  Weight fairly stable.  "

## 2021-07-23 ENCOUNTER — OFFICE VISIT (OUTPATIENT)
Dept: MEDICAL GROUP | Facility: PHYSICIAN GROUP | Age: 36
End: 2021-07-23
Payer: COMMERCIAL

## 2021-07-23 VITALS
DIASTOLIC BLOOD PRESSURE: 82 MMHG | WEIGHT: 212.8 LBS | TEMPERATURE: 97.3 F | RESPIRATION RATE: 14 BRPM | HEIGHT: 71 IN | SYSTOLIC BLOOD PRESSURE: 114 MMHG | HEART RATE: 60 BPM | OXYGEN SATURATION: 95 % | BODY MASS INDEX: 29.79 KG/M2

## 2021-07-23 DIAGNOSIS — L91.8 SKIN TAG: ICD-10-CM

## 2021-07-23 DIAGNOSIS — K52.9 CHRONIC DIARRHEA: ICD-10-CM

## 2021-07-23 PROCEDURE — 99213 OFFICE O/P EST LOW 20 MIN: CPT | Performed by: FAMILY MEDICINE

## 2021-07-23 NOTE — ASSESSMENT & PLAN NOTE
This is a new problem.  Patient states he had a skin tag for years but just recently to the end of it turned black and has been uncomfortable.  He like to have the rest of it removed.

## 2021-07-23 NOTE — ASSESSMENT & PLAN NOTE
This is a chronic problem.  Patient is asking if the results of his colonoscopy has come through as he has not heard anything.
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
## 2021-07-29 ENCOUNTER — HOSPITAL ENCOUNTER (OUTPATIENT)
Dept: LAB | Facility: MEDICAL CENTER | Age: 36
End: 2021-07-29
Attending: NURSE PRACTITIONER
Payer: COMMERCIAL

## 2021-07-29 PROCEDURE — 83516 IMMUNOASSAY NONANTIBODY: CPT

## 2021-07-29 PROCEDURE — 36415 COLL VENOUS BLD VENIPUNCTURE: CPT

## 2021-07-29 PROCEDURE — 82784 ASSAY IGA/IGD/IGG/IGM EACH: CPT

## 2021-07-31 LAB — IGA SERPL-MCNC: 133 MG/DL (ref 68–408)

## 2021-08-01 LAB — TTG IGA SER IA-ACNC: <2 U/ML (ref 0–3)

## 2022-03-29 ENCOUNTER — APPOINTMENT (OUTPATIENT)
Dept: URGENT CARE | Facility: PHYSICIAN GROUP | Age: 37
End: 2022-03-29
Payer: COMMERCIAL

## 2022-03-31 ENCOUNTER — APPOINTMENT (OUTPATIENT)
Dept: MEDICAL GROUP | Facility: PHYSICIAN GROUP | Age: 37
End: 2022-03-31
Payer: COMMERCIAL

## 2024-12-16 ENCOUNTER — OFFICE VISIT (OUTPATIENT)
Dept: URGENT CARE | Facility: PHYSICIAN GROUP | Age: 39
End: 2024-12-16
Payer: COMMERCIAL

## 2024-12-16 VITALS
SYSTOLIC BLOOD PRESSURE: 128 MMHG | BODY MASS INDEX: 32.45 KG/M2 | RESPIRATION RATE: 17 BRPM | HEIGHT: 71 IN | TEMPERATURE: 97 F | HEART RATE: 64 BPM | OXYGEN SATURATION: 97 % | DIASTOLIC BLOOD PRESSURE: 74 MMHG | WEIGHT: 231.81 LBS

## 2024-12-16 DIAGNOSIS — S39.012A STRAIN OF LUMBAR REGION, INITIAL ENCOUNTER: ICD-10-CM

## 2024-12-16 DIAGNOSIS — K21.9 GASTROESOPHAGEAL REFLUX DISEASE, UNSPECIFIED WHETHER ESOPHAGITIS PRESENT: ICD-10-CM

## 2024-12-16 DIAGNOSIS — K59.00 CONSTIPATION, UNSPECIFIED CONSTIPATION TYPE: ICD-10-CM

## 2024-12-16 PROCEDURE — 3074F SYST BP LT 130 MM HG: CPT | Performed by: STUDENT IN AN ORGANIZED HEALTH CARE EDUCATION/TRAINING PROGRAM

## 2024-12-16 PROCEDURE — 3078F DIAST BP <80 MM HG: CPT | Performed by: STUDENT IN AN ORGANIZED HEALTH CARE EDUCATION/TRAINING PROGRAM

## 2024-12-16 PROCEDURE — 99204 OFFICE O/P NEW MOD 45 MIN: CPT | Performed by: STUDENT IN AN ORGANIZED HEALTH CARE EDUCATION/TRAINING PROGRAM

## 2024-12-16 RX ORDER — LIDOCAINE 4 G/G
PATCH TOPICAL
Qty: 15 PATCH | Refills: 0 | Status: SHIPPED | OUTPATIENT
Start: 2024-12-16

## 2024-12-16 RX ORDER — PANTOPRAZOLE SODIUM 40 MG/1
40 TABLET, DELAYED RELEASE ORAL DAILY
Qty: 30 TABLET | Refills: 1 | Status: SHIPPED | OUTPATIENT
Start: 2024-12-16

## 2024-12-16 RX ORDER — POLYETHYLENE GLYCOL 3350 17 G/17G
17 POWDER, FOR SOLUTION ORAL DAILY
Qty: 510 G | Refills: 0 | Status: SHIPPED | OUTPATIENT
Start: 2024-12-16

## 2024-12-16 RX ORDER — ACETAMINOPHEN 500 MG
1000 TABLET ORAL EVERY 8 HOURS PRN
Qty: 100 TABLET | Refills: 0 | Status: SHIPPED | OUTPATIENT
Start: 2024-12-16

## 2024-12-17 NOTE — PROGRESS NOTES
Subjective:   CHIEF COMPLAINT  Chief Complaint   Patient presents with    Back Pain     Lower back pain to his tail bone pain, constant pain, having GI issues doesn't feel right, moving/bending/standing and putting any it hurts, was picking up something and heard a pop,sx started Tuesday or Wednesday last week        HPI    History of Present Illness  Roshan Bailey is a 39 y.o. male who presents for evaluation of lower back pain.    He reports experiencing lower back pain, which he attributes to an incident last Wednesday where he heard a popping sound while lifting an object. The pain has been persistent since then. He also reports associated abdominal discomfort that began 2 days after the onset of the back pain. He does not experience any radiating leg pain, numbness or tingling in his inner thighs, or loss of sensation in his inner thighs. He also does not report any urinary or fecal incontinence. However, he does report constipation and a decrease in bowel movements over the past 5 days. He does not experience any vomiting or fevers. He has no history of spinal or abdominal surgery. He reports no history of intravenous drug use. He also reports shortness of breath but does not experience any chest pain or cough. He reports no hernias or bulges. The pain is exacerbated by sitting and relieved by lying on his back. He has been managing the pain with heat application and ibuprofen 200 mg twice daily. He has a history of a herniated disc from a few years ago, which was managed with physical therapy and core strengthening exercises.    He reports experiencing heartburn and acid reflux but is not currently taking any medication for these symptoms.    Supplemental Information  [facility]    SOCIAL HISTORY  He reports no history of i.v. drug use.    MEDICATIONS  ibuprofen        REVIEW OF SYSTEMS  General: no fever or chills  GI: no nausea or vomiting  See HPI for further  "details.    PAST MEDICAL HISTORY  Patient Active Problem List    Diagnosis Date Noted    Skin tag 07/23/2021    Overweight 03/31/2021    Snoring 04/30/2019    Other hyperlipidemia 04/30/2019    Vitamin D deficiency 06/01/2018    Chronic diarrhea 05/22/2018       SURGICAL HISTORY  patient denies any surgical history    ALLERGIES  No Known Allergies    CURRENT MEDICATIONS  Home Medications       Reviewed by Zaida Hickman, Med Ass't (Medical Assistant) on 12/16/24 at 1625  Med List Status: <None>     Medication Last Dose Status   acetaminophen (TYLENOL) 500 MG Tab Not Taking Active   ibuprofen (MOTRIN) 200 MG Tab PRN Active   ibuprofen (MOTRIN) 800 MG Tab Not Taking Active   Pseudoeph-Doxylamine-DM-APAP (NYQUIL PO) Not Taking Active                    SOCIAL HISTORY  Social History     Tobacco Use    Smoking status: Never    Smokeless tobacco: Never   Vaping Use    Vaping status: Never Used   Substance and Sexual Activity    Alcohol use: Yes     Alcohol/week: 0.6 oz     Types: 1 Cans of beer per week     Comment: 2-3 x monthly    Drug use: No    Sexual activity: Not on file       FAMILY HISTORY  Family History   Problem Relation Age of Onset    Thyroid Mother     Diabetes Father           Objective:   PHYSICAL EXAM  VITAL SIGNS: /74 (BP Location: Right arm, Patient Position: Sitting, BP Cuff Size: Large adult long)   Pulse 64   Temp 36.1 °C (97 °F) (Temporal)   Resp 17   Ht 1.803 m (5' 11\")   Wt 105 kg (231 lb 13 oz)   SpO2 97%   BMI 32.33 kg/m²     Gen: no acute distress, normal voice  Skin: dry, intact, moist mucosal membranes  Eyes: No conjunctival injection bilaterally.  Neck: Normal range of motion. No meningeal signs.   Lungs: No increased work of breathing.  CTAB w/ symmetric expansion  CV: RRR w/o murmurs or clicks  Abdomen: Bowel sounds normal, soft, no distention.  Mild TTP along the left lower quadrant without any rigidity or involuntary guarding.      MSK: Lumbar spine without any " erythema, ecchymosis or edema.  Appropriate range of motion.  No focal midline TTP, step-off or crepitus.  No motor or sensory deficits L4-S1 bilaterally with good perfusion of distal soft tissue.  Psych: normal affect, normal judgement, alert, awake    Assessment/Plan:     1. Strain of lumbar region, initial encounter  acetaminophen (TYLENOL) 500 MG Tab    lidocaine (ASPERFLEX) 4 % Patch    Referral to Physical Therapy      2. Gastroesophageal reflux disease, unspecified whether esophagitis present  pantoprazole (PROTONIX) 40 MG Tablet Delayed Response      3. Constipation, unspecified constipation type  polyethylene glycol 3350 (MIRALAX) 17 GM/SCOOP Powder      1) chronic condition with acute exacerbation secondary to bending.  Has had similar symptoms in the past and benefited from physical therapy.  Exam was reassuring without any red flags.  -Ordered Tylenol 1000 mg every 8 hours as needed  -Ibuprofen 800 mg every 8 hours as needed  -Ordered topical lidocaine patch  -Ordered referral to physical therapy    2) chronic condition.    -Ordered Protonix    3) likely source of his abdominal discomfort. Afebrile w/o vomiting. Abd exam reassuring. Could also be some contribution from GERD  -Ordered MiraLAX  -Return to urgent care any new/worsening symptoms or further questions or concerns.  Patient understood everything discussed.  All questions were answered.            Please note that this dictation was created using voice recognition software. I have made a reasonable attempt to correct obvious errors, but I expect that there are errors of grammar and possibly content that I did not discover before finalizing the note.

## 2024-12-29 DIAGNOSIS — S39.012D STRAIN OF LUMBAR REGION, SUBSEQUENT ENCOUNTER: ICD-10-CM
